# Patient Record
Sex: FEMALE | Race: OTHER | HISPANIC OR LATINO | Employment: UNEMPLOYED | URBAN - METROPOLITAN AREA
[De-identification: names, ages, dates, MRNs, and addresses within clinical notes are randomized per-mention and may not be internally consistent; named-entity substitution may affect disease eponyms.]

---

## 2017-07-14 ENCOUNTER — APPOINTMENT (EMERGENCY)
Dept: RADIOLOGY | Facility: HOSPITAL | Age: 52
End: 2017-07-14
Payer: COMMERCIAL

## 2017-07-14 ENCOUNTER — HOSPITAL ENCOUNTER (EMERGENCY)
Facility: HOSPITAL | Age: 52
Discharge: HOME | End: 2017-07-14
Attending: EMERGENCY MEDICINE | Admitting: EMERGENCY MEDICINE
Payer: COMMERCIAL

## 2017-07-14 VITALS
DIASTOLIC BLOOD PRESSURE: 59 MMHG | SYSTOLIC BLOOD PRESSURE: 111 MMHG | HEART RATE: 80 BPM | RESPIRATION RATE: 16 BRPM | TEMPERATURE: 97.8 F | OXYGEN SATURATION: 98 %

## 2017-07-14 DIAGNOSIS — M25.511 ACUTE PAIN OF RIGHT SHOULDER: Primary | ICD-10-CM

## 2017-07-14 LAB — DEPRECATED D DIMER PPP: <270 NG/ML (FEU) (ref 0–424)

## 2017-07-14 PROCEDURE — 85379 FIBRIN DEGRADATION QUANT: CPT | Performed by: EMERGENCY MEDICINE

## 2017-07-14 PROCEDURE — 36415 COLL VENOUS BLD VENIPUNCTURE: CPT | Performed by: EMERGENCY MEDICINE

## 2017-07-14 PROCEDURE — 73030 X-RAY EXAM OF SHOULDER: CPT

## 2017-07-14 PROCEDURE — 99284 EMERGENCY DEPT VISIT MOD MDM: CPT

## 2017-07-14 PROCEDURE — 96374 THER/PROPH/DIAG INJ IV PUSH: CPT

## 2017-07-14 RX ORDER — KETOROLAC TROMETHAMINE 30 MG/ML
15 INJECTION, SOLUTION INTRAMUSCULAR; INTRAVENOUS ONCE
Status: COMPLETED | OUTPATIENT
Start: 2017-07-14 | End: 2017-07-14

## 2017-07-14 RX ORDER — NAPROXEN 500 MG/1
500 TABLET ORAL 2 TIMES DAILY WITH MEALS
Qty: 20 TABLET | Refills: 0 | Status: SHIPPED | OUTPATIENT
Start: 2017-07-14 | End: 2018-08-03

## 2017-07-14 RX ADMIN — KETOROLAC TROMETHAMINE 15 MG: 30 INJECTION, SOLUTION INTRAMUSCULAR at 15:58

## 2018-01-18 NOTE — CONSULTS
Assessment    1  DCIS (ductal carcinoma in situ) of breast (233 0) (D05 10)    Plan  DCIS (ductal carcinoma in situ) of breast    · TraMADol HCl - 50 MG Oral Tablet; TAKE 1 TABLET 3 TIMES DAILY AS NEEDED   Rx By: Akin Baez; Dispense: 7 Days ; #:20 Tablet; Refill: 0; For: DCIS (ductal carcinoma in situ) of breast; DEDRICK = N; Print Rx   · Follow-up visit in 2 weeks Evaluation and Treatment  Follow-up  Status: Hold For -  Scheduling  Requested for: [id number]   Ordered; For: DCIS (ductal carcinoma in situ) of breast; Ordered By: Akni Baez Performed:  Due: [id number]   · Schedule Surgery Treatment  Procedure: Right breast lumpectomy needle localization   Status: Hold For - Scheduling  Requested for: [id number]   Ordered; For: DCIS (ductal carcinoma in situ) of breast; Ordered By: Akin Baez Performed:  Due: [id number]    Discussion/Summary  Discussion Summary:   Right breast ductal carcinoma in situ  This is amenable to needle localization with lumpectomy  Rationale for this was discussed with patient and her   Risks and benefits of surgery including infection, bleeding, and need for possible additional surgery were discussed with her  She understands and wishes to proceed as outlined  We'll therefore schedule for right breast needle localization and lumpectomy  Medication SE Review and Pt Understands Tx: The treatment plan was reviewed with the patient/guardian  The patient/guardian understands and agrees with the treatment plan   Understands and agrees with treatment plan: The treatment plan was reviewed with the patient/guardian  The patient/guardian understands and agrees with the treatment plan      Chief Complaint  Chief Complaint Free Text Note Form: Patient here for surgical consult right breast DCIS        History of Present Illness  HPI: Mrs Janett Bryant is a 49-year-old woman who underwent her annual mammogram  She is noted to have an area of calcifications in the right breast  This led to a biopsy confirming ductal carcinoma in situ  She herself does not feel any mass in this area  Work up with a mammogram, she would not even know anything was present in her breast       Review of Systems  Complete Female ROS SurgOnc:   Constitutional: The patient denies new or recent history of general fatigue, no recent weight loss, no change in appetite  Eyes: No complaints of visual problems, no scleral icterus  ENT: no complaints of ear pain, no hoarseness, no difficulty swallowing  Cardiovascular: No complaints of chest pain, no palpitations, no ankle edema  Respiratory: No complaints of shortness of breath, no cough  Gastrointestinal: No complaints of jaundice, no bloody stools, no pale stools  Genitourinary: No complaints of dysuria, no hematuria, no nocturia, no frequent urination, no urethral discharge  Musculoskeletal: No complaints of weakness, paralysis, joint stiffness or arthralgias,  Integumentary: breast lump  Neurological: No complaints of convulsions, no seizures, no dizziness  Hematologic/Lymphatic: No complaints of easy bruising  ROS Reviewed:   ROS reviewed  Past Medical History  Active Problems And Past Medical History Reviewed: The active problems and past medical history were reviewed and updated today  Surgical History  Surgical History Reviewed: The surgical history was reviewed and updated today  Family History  Family History Reviewed: The family history was reviewed and updated today  Social History  Social History Reviewed: The social history was reviewed and updated today  Current Meds  Medication List Reviewed: The medication list was reviewed and updated today        Vitals  Vital Signs [Data Includes: Current Encounter]    Recorded: [id number] 12:01PM   Temperature 98 7 F   Heart Rate 80   Respiration 16   Systolic 449   Diastolic 68   Height 5 ft 2 in   Weight 161 lb 6 4 oz   BMI Calculated 29 52   BSA Calculated 1 74 Physical Exam    Constitutional: General appearance: The Patient is well-developed, well-nourished female who appears her stated age in no acute distress  She is pleasant and talkative  HEENT: Head and face: Normal   Conjunctiva and lids: No swelling, erythema, or discharge  MARIA TERESA, EOMI and the sclerae are anicteric  There is no oral pathology noted  There is no nasal pathology noted  The thyroid is normal to inspection and palpation  There is no appreciable cervical adenopathy   There are no carotid bruits  The Cranial Nerves II - XII are grossly intact  Neck is supple without adenopathy  Chest: The chest is normal to inspection  The lungs are clear to auscultation  There are bilaterally symmetrical breath sounds  There is a RRR, normal S1 and S2, without murmurs, rub or gallop  The pulses are normal at the radial and dorsalis pedis and symmetrical     Abdomen: The abdomen is normal to inspection and percussion  It is soft, non-tender  There are normal bowel sounds  There are no abnormal masses  There is no evidence of hepatosplenomegaly  She does not have an umbilical hernia  Extremities: There is no clubbing or cyanosis  There is no edema  Sensation is intact to light touch  Neuro: Grossly nonfocal   Motor strength: Normal motor strength     Orientation to person, place and time: Normal   Mood and affect: Normal     Lymphatic: no evidence of cervical adenopathy bilaterally  no evidence of axillary adenopathy bilaterally  Skin: Warm, anicteric  Examination of Breast Normal        Results/Data  Diagnostic Studies Reviewed Surg Onc:   X-ray Review * MAMMO SCREENING BILATERAL W CAD Final    No Documents Attached    Test ordered by:         --------------------------------------------------------------------------------   Patient History:   Family history of breast cancer in sister at age 47  Patient has never smoked  Patient's BMI is 30 2       Reason for exam: screening (asymptomatic)  Screening     Mammo Screening Bilateral W CAD: January 11, 2016 - Check In #:   [de-identified]   Bilateral CC and MLO view(s) were taken  Technologist: JESUS Dean (JESUS)(M)   Prior study comparison: December 10, 2012, bilateral digital   screening mammogram, performed at [place] [address]  Eli 15, 2009, bilateral digital screening   mammogram, performed at [place] [address]  June 6, 2008, left breast unilateral diagnostic   mammogram, performed at [address] [place] [address]  May   30, 2008, bilateral [facility] DIGITAL SCRN MAMMO, performed at [place] [address]  There are scattered fibroglandular densities  There is a group   of calcifications in the upper outer right breast at the 10   o'clock position, 7 cm from the nipple  Calcifications were seen   in this area of the breast on prior studies  The patient's most   recent mammogram was dated 2012  The number of calcifications   has increased since 2012  A few, but not all, of the   calcifications are seen to layer on the mediolateral oblique   view, and thus further evaluation with lateral and spot   magnification views is recommended to exclude pathology  The parenchymal pattern is otherwise stable  No dominant soft   tissue mass or architectural distortion are noted in either   breast  The skin and nipple contours are within normal limits  Impressions:   1  Upper outer right breast calcifications  Lateral and spot   magnification views are recommended  2  Stable left breast mammogram      ASSESSMENT: BiRad:0 - Incomplete: needs additional imaging   evaluation - Right     Recommendation:   Further imaging of the right breast    A breast health care nurse from our facility will be contacting   the patient regarding the need for additional imaging  Analyzed by CAD     8-10% of cancers will be missed on mammography   Management of a   palpable abnormality must be based on clinical grounds  Patients   will be notified of their results via letter from our facility  Accredited by [employer] and FDA  Transcription Location: [address] [place] [address] [place]: [id number]     Risk Value(s):   Tyrer-Cuzick 10 Year: 4 227%, Tyrer-Cuzick Lifetime: 17 535%,   Myriad Table: 1 5%, MARIANN 5 Year: 1 3%, NCI Lifetime: 11 8%         Ordered by: EPIC, Provider Collected/Examined: [id number] 03:29PM   Verification Not Required Stage: Final Resulted: [id number] 03:29PM Last Updated: [id number] 06:59PM Accession: [id number]  Pathology Review MAMMO DIAGNOSTIC RIGHT W CAD Final    No Documents Attached    Test ordered by: Sal Vaughan         --------------------------------------------------------------------------------   Patient History:   Family history of breast cancer in sister at age 47  Patient has never smoked  Patient's BMI is 30 2  Reason for exam: additional evaluation requested from abnormal   screening  Callback for calcification/density in the upper outer right   breast      Mammo Diagnostic Right W CAD: January 13, 2016 - Check In #:   [de-identified]   CC with magnification, ML with magnification, and ML view(s) were   taken of the right breast      Technologist: JESUS Garcia (R)(M)   Prior study comparison: January 11, 2016, mammo screening   bilateral W CAD, performed at [address] [place] [address],[address]  December 10, 2012, bilateral digital screening   mammogram, performed at [address] [place] [address]  Eli 15, 2009, bilateral digital screening mammogram,   performed at [address] [place] [address]  May 30,   2008, bilateral [facility] DIGITAL SCRN MAMMO, performed at [address] [place] [address]  November 20, 2006, bilateral   screening mammogram w/CAD, performed at [address] [place] [address]  There are scattered fibroglandular densities   These images were   obtained using digital technique and with the assistance of   Computer Aided Detection  There are no dominant masses, foci of   architectural distortion to suggest malignancy  The visualized   skin appears normal  The numerous calcifications in the upper   outer right breast layer, there are no new/indeterminate   calcifications for which stereotactic core biopsy is recommended  Targeted ultrasound demonstrates no evidence of hypoechoic mass   or architectural distortion to suggest malignancy  Calcifications may be identified, however, stereotactic core   biopsy would be higher yield in targeting the more indeterminate   calcifications  US Breast Right Limited: January 13, 2016 - Check In #: [de-identified]   Technologist: Josué Presjania, TULIO     ASSESSMENT: BiRad:4 - Suspicious (Overall)     Recommendation:   Stereotactic biopsy of the right breast    Analyzed by CAD     Transcription Location: [address]   Signing Station: [id number]     Risk Value(s):   Tyrer-Cuzick 10 Year: 4 227%, Tyrer-Cuzick Lifetime: 17 535%,   Myriad Table: 1 5%, MARIANN 5 Year: 1 9%, NCI Lifetime: 16 4%         Ordered by: EPIC, Provider Collected/Examined: [id number] 01:19PM   Verification Not Required Stage: Final Resulted: [id number] 01:19PM Last Updated: [id number] 01:19PM Accession: [id number]      US BREAST RIGHT LIMITED Final    No Documents Attached    Test ordered by: Browning Spina         --------------------------------------------------------------------------------   Patient History:   Family history of breast cancer in sister at age 47  Patient has never smoked  Patient's BMI is 30 2  Reason for exam: additional evaluation requested from abnormal   screening     Callback for calcification/density in the upper outer right   breast      Mammo Diagnostic Right W CAD: January 13, 2016 - Check In #:   [de-identified]   CC with magnification, ML with magnification, and ML view(s) were   taken of the right breast      Technologist: Steven Clark, R  T (R)(M)   Prior study comparison: January 11, 2016, mammo screening   bilateral W CAD, performed at [facility]  December 10, 2012, bilateral digital screening   mammogram, performed at [place] [address]  Eli 15, 2009, bilateral digital screening mammogram,   performed at [place] [address]  May 30,   2008, bilateral [facility] DIGITAL SCRN MAMMO, performed at [address] [place] [address]  November 20, 2006, bilateral   screening mammogram w/CAD, performed at [place] [address]  There are scattered fibroglandular densities  These images were   obtained using digital technique and with the assistance of   Computer Aided Detection  There are no dominant masses, foci of   architectural distortion to suggest malignancy  The visualized   skin appears normal  The numerous calcifications in the upper   outer right breast layer, there are no new/indeterminate   calcifications for which stereotactic core biopsy is recommended  Targeted ultrasound demonstrates no evidence of hypoechoic mass   or architectural distortion to suggest malignancy  Calcifications may be identified, however, stereotactic core   biopsy would be higher yield in targeting the more indeterminate   calcifications       US Breast Right Limited: January 13, 2016 - Check In #: [de-identified]   Technologist: Zhao Camacho RDMS     ASSESSMENT: BiRad:4 - Suspicious (Overall)     Recommendation:   Stereotactic biopsy of the right breast    Analyzed by CAD     Transcription Location: [address]   Signing Station: [id number]     Risk Value(s):   Tyrer-Cuzick 10 Year: 4 227%, Tyrer-Cuzick Lifetime: 17 535%,   Myriad Table: 1 5%, MARIANN 5 Year: 1 9%, NCI Lifetime: 16 4%         Ordered byLawyer Kayser, Provider Collected/Examined: [id number] 01:19PM   Verification Not Required Stage: Final Resulted: [id number] 01:19PM Last Updated: [id number] 01:19PM Accession: [id number]      * MAMMO SCREENING BILATERAL W CAD Final    No Documents Attached    Test ordered by:         --------------------------------------------------------------------------------   Patient History:   Family history of breast cancer in sister at age 47  Patient has never smoked  Patient's BMI is 30 2  Reason for exam: screening (asymptomatic)  Screening     Mammo Screening Bilateral W CAD: January 11, 2016 - Check In #:   [de-identified]   Bilateral CC and MLO view(s) were taken  Technologist: JESUS Crane (JESUS)(M)   Prior study comparison: December 10, 2012, bilateral digital   screening mammogram, performed at [place] [address]  Eli 15, 2009, bilateral digital screening   mammogram, performed at [place] [address]  June 6, 2008, left breast unilateral diagnostic   mammogram, performed at [address] [place] [address]  May   30, 2008, bilateral [facility] DIGITAL SCRN MAMMO, performed at [place] [address]  There are scattered fibroglandular densities  There is a group   of calcifications in the upper outer right breast at the 10   o'clock position, 7 cm from the nipple  Calcifications were seen   in this area of the breast on prior studies  The patient's most   recent mammogram was dated 2012  The number of calcifications   has increased since 2012  A few, but not all, of the   calcifications are seen to layer on the mediolateral oblique   view, and thus further evaluation with lateral and spot   magnification views is recommended to exclude pathology  The parenchymal pattern is otherwise stable  No dominant soft   tissue mass or architectural distortion are noted in either   breast  The skin and nipple contours are within normal limits  Impressions:   1  Upper outer right breast calcifications  Lateral and spot   magnification views are recommended     2  Stable left breast mammogram      ASSESSMENT: BiRad:0 - Incomplete: needs additional imaging   evaluation - Right     Recommendation:   Further imaging of the right breast    A breast health care nurse from our facility will be contacting   the patient regarding the need for additional imaging  Analyzed by CAD     8-10% of cancers will be missed on mammography  Management of a   palpable abnormality must be based on clinical grounds  Patients   will be notified of their results via letter from our facility  Accredited by [employer] and FDA  Transcription Location: [facility]: [id number]     Risk Value(s):   Tyrer-Cuzick 10 Year: 4 227%, Tyrer-Cuzick Lifetime: 17 535%,   Myriad Table: 1 5%, MARIANN 5 Year: 1 3%, NCI Lifetime: 11 8%         Ordered by: EPIC, Provider Collected/Examined: [id number] 03:29PM   Verification Not Required Stage: Final Resulted: [id number] 03:29PM Last Updated: [id number] 06:59PM Accession: [id number]        Signatures   Electronically signed by : Adeel Loyd MD; Jan 29 2016 12:48PM EST                       (Author)

## 2018-08-03 ENCOUNTER — APPOINTMENT (EMERGENCY)
Dept: CT IMAGING | Facility: HOSPITAL | Age: 53
End: 2018-08-03
Payer: COMMERCIAL

## 2018-08-03 ENCOUNTER — HOSPITAL ENCOUNTER (EMERGENCY)
Facility: HOSPITAL | Age: 53
Discharge: HOME | End: 2018-08-03
Attending: EMERGENCY MEDICINE | Admitting: EMERGENCY MEDICINE
Payer: COMMERCIAL

## 2018-08-03 VITALS
OXYGEN SATURATION: 96 % | SYSTOLIC BLOOD PRESSURE: 106 MMHG | DIASTOLIC BLOOD PRESSURE: 53 MMHG | HEART RATE: 96 BPM | RESPIRATION RATE: 20 BRPM | TEMPERATURE: 97.8 F

## 2018-08-03 DIAGNOSIS — K52.9 COLITIS, ACUTE: Primary | ICD-10-CM

## 2018-08-03 LAB
ALBUMIN SERPL BCP-MCNC: 3.8 G/DL (ref 3.5–5)
ALP SERPL-CCNC: 86 U/L (ref 46–116)
ALT SERPL W P-5'-P-CCNC: 27 U/L (ref 12–78)
ANION GAP SERPL CALCULATED.3IONS-SCNC: 7 MMOL/L (ref 4–13)
AST SERPL W P-5'-P-CCNC: 20 U/L (ref 5–45)
BACTERIA UR QL AUTO: ABNORMAL /HPF
BASOPHILS # BLD MANUAL: 0 THOUSAND/UL (ref 0–0.1)
BASOPHILS NFR MAR MANUAL: 0 % (ref 0–1)
BILIRUB SERPL-MCNC: 0.25 MG/DL (ref 0.2–1)
BILIRUB UR QL STRIP: NEGATIVE
BUN SERPL-MCNC: 11 MG/DL (ref 5–25)
CALCIUM SERPL-MCNC: 9.3 MG/DL (ref 8.3–10.1)
CHLORIDE SERPL-SCNC: 102 MMOL/L (ref 100–108)
CLARITY UR: CLEAR
CO2 SERPL-SCNC: 28 MMOL/L (ref 21–32)
COLOR UR: YELLOW
CREAT SERPL-MCNC: 0.92 MG/DL (ref 0.6–1.3)
EOSINOPHIL # BLD MANUAL: 0 THOUSAND/UL (ref 0–0.4)
EOSINOPHIL NFR BLD MANUAL: 0 % (ref 0–6)
ERYTHROCYTE [DISTWIDTH] IN BLOOD BY AUTOMATED COUNT: 14.1 % (ref 11.6–15.1)
GFR SERPL CREATININE-BSD FRML MDRD: 71 ML/MIN/1.73SQ M
GLUCOSE SERPL-MCNC: 87 MG/DL (ref 65–140)
GLUCOSE UR STRIP-MCNC: NEGATIVE MG/DL
HCT VFR BLD AUTO: 43.6 % (ref 34.8–46.1)
HGB BLD-MCNC: 14.1 G/DL (ref 11.5–15.4)
HGB UR QL STRIP.AUTO: ABNORMAL
KETONES UR STRIP-MCNC: ABNORMAL MG/DL
LEUKOCYTE ESTERASE UR QL STRIP: NEGATIVE
LIPASE SERPL-CCNC: 113 U/L (ref 73–393)
LYMPHOCYTES # BLD AUTO: 1.26 THOUSAND/UL (ref 0.6–4.47)
LYMPHOCYTES # BLD AUTO: 24 % (ref 14–44)
MCH RBC QN AUTO: 27.7 PG (ref 26.8–34.3)
MCHC RBC AUTO-ENTMCNC: 32.3 G/DL (ref 31.4–37.4)
MCV RBC AUTO: 86 FL (ref 82–98)
MONOCYTES # BLD AUTO: 0.32 THOUSAND/UL (ref 0–1.22)
MONOCYTES NFR BLD: 6 % (ref 4–12)
NEUTROPHILS # BLD MANUAL: 3.68 THOUSAND/UL (ref 1.85–7.62)
NEUTS BAND NFR BLD MANUAL: 12 % (ref 0–8)
NEUTS SEG NFR BLD AUTO: 58 % (ref 43–75)
NITRITE UR QL STRIP: NEGATIVE
NON-SQ EPI CELLS URNS QL MICRO: ABNORMAL /HPF
NRBC BLD AUTO-RTO: 0 /100 WBCS
PH UR STRIP.AUTO: 7 [PH] (ref 4.5–8)
PLATELET # BLD AUTO: 209 THOUSANDS/UL (ref 149–390)
PLATELET BLD QL SMEAR: ADEQUATE
PMV BLD AUTO: 9.9 FL (ref 8.9–12.7)
POTASSIUM SERPL-SCNC: 3.2 MMOL/L (ref 3.5–5.3)
PROT SERPL-MCNC: 7.9 G/DL (ref 6.4–8.2)
PROT UR STRIP-MCNC: ABNORMAL MG/DL
RBC # BLD AUTO: 5.09 MILLION/UL (ref 3.81–5.12)
RBC #/AREA URNS AUTO: ABNORMAL /HPF
RBC MORPH BLD: NORMAL
SODIUM SERPL-SCNC: 137 MMOL/L (ref 136–145)
SP GR UR STRIP.AUTO: 1.01 (ref 1–1.03)
TOTAL CELLS COUNTED SPEC: 100
UROBILINOGEN UR QL STRIP.AUTO: 0.2 E.U./DL
WBC # BLD AUTO: 5.25 THOUSAND/UL (ref 4.31–10.16)
WBC #/AREA URNS AUTO: ABNORMAL /HPF

## 2018-08-03 PROCEDURE — 85027 COMPLETE CBC AUTOMATED: CPT | Performed by: EMERGENCY MEDICINE

## 2018-08-03 PROCEDURE — 80053 COMPREHEN METABOLIC PANEL: CPT | Performed by: EMERGENCY MEDICINE

## 2018-08-03 PROCEDURE — 74177 CT ABD & PELVIS W/CONTRAST: CPT

## 2018-08-03 PROCEDURE — 36415 COLL VENOUS BLD VENIPUNCTURE: CPT

## 2018-08-03 PROCEDURE — 81001 URINALYSIS AUTO W/SCOPE: CPT

## 2018-08-03 PROCEDURE — 85007 BL SMEAR W/DIFF WBC COUNT: CPT | Performed by: EMERGENCY MEDICINE

## 2018-08-03 PROCEDURE — 99285 EMERGENCY DEPT VISIT HI MDM: CPT

## 2018-08-03 PROCEDURE — 83690 ASSAY OF LIPASE: CPT | Performed by: EMERGENCY MEDICINE

## 2018-08-03 RX ORDER — CIPROFLOXACIN 500 MG/1
500 TABLET, FILM COATED ORAL EVERY 12 HOURS
Qty: 20 TABLET | Refills: 0 | Status: SHIPPED | OUTPATIENT
Start: 2018-08-03 | End: 2018-08-13

## 2018-08-03 RX ADMIN — IOHEXOL 100 ML: 350 INJECTION, SOLUTION INTRAVENOUS at 11:36

## 2018-08-03 NOTE — ED NOTES
Patient made aware if she needs to have a bowel movement to let us know as we need to collect specimen       Callum Powell RN  08/03/18 5723

## 2018-08-03 NOTE — ED NOTES
Patient still unable to provide stool sample; provider notified      Cristine Chávez RN  08/03/18 [phone]

## 2018-08-03 NOTE — ED NOTES
Patient ambulatory to the bathroom with steady gait at this time to provide a stool sample      Robbi Seo RN  08/03/18 9982

## 2018-08-03 NOTE — ED PROVIDER NOTES
History  Chief Complaint   Patient presents with    Black or Bloody Stool     pt reports blood in stool x2 days  19-year-old female presents for evaluation of several days mild abdominal discomfort with associated blood in loose stools  The patient states that her symptoms began after she returned from a medical mission trip to [facility]  The patient denies previous history of GI bleeding, or abdominal pathology  History provided by:  Patient  Black or Bloody Stool   Quality:  Bright red  Amount: Moderate  Timing:  Constant  Chronicity:  New  Context: defecation and spontaneously    Context: not anal fissures, not anal penetration, not diarrhea and not rectal pain    Similar prior episodes: no    Relieved by:  Nothing  Worsened by:  Defecation  Ineffective treatments:  None tried  Associated symptoms: abdominal pain    Associated symptoms: no dizziness, no fever, no recent illness and no vomiting        None       Past Medical History:   Diagnosis Date    Breast mass     right mass-needle loc w/lumpectomy today    Wears glasses        Past Surgical History:   Procedure Laterality Date     SECTION      x2    DIAGNOSTIC LAPAROSCOPY      MD MASTECTOMY, PARTIAL Right 2016    Procedure: LUMPECTOMY RIGHT BREAST WITH FAXITRON;  Surgeon: Sabra Whitney MD;  Location: [facility] OR;  Service: Surgical Oncology    TUBAL LIGATION         History reviewed  No pertinent family history  I have reviewed and agree with the history as documented  Social History   Substance Use Topics    Smoking status: Never Smoker    Smokeless tobacco: Never Used    Alcohol use No        Review of Systems   Constitutional: Negative for chills, fatigue and fever  HENT: Negative for sore throat  Eyes: Negative for visual disturbance  Respiratory: Negative for shortness of breath  Cardiovascular: Negative for chest pain  Gastrointestinal: Positive for abdominal pain and blood in stool   Negative for diarrhea, nausea, rectal pain and vomiting  Genitourinary: Negative for difficulty urinating, dysuria and pelvic pain  Musculoskeletal: Negative for back pain  Skin: Negative for rash  Neurological: Negative for dizziness, syncope, weakness and headaches  All other systems reviewed and are negative  Physical Exam  Physical Exam   Constitutional: She is oriented to person, place, and time  She appears well-developed and well-nourished  No distress  HENT:   Head: Normocephalic and atraumatic  Right Ear: External ear normal    Left Ear: External ear normal    Eyes: Conjunctivae and EOM are normal  Pupils are equal, round, and reactive to light  No scleral icterus  Neck: Normal range of motion  Cardiovascular: Normal rate, regular rhythm and normal heart sounds  Pulmonary/Chest: Effort normal and breath sounds normal  No respiratory distress  Abdominal: Soft  Bowel sounds are normal  There is tenderness in the left lower quadrant  There is no rebound and no guarding  Genitourinary: Rectal exam shows guaiac positive stool ( brown/bloody)  Musculoskeletal: Normal range of motion  She exhibits no edema  Neurological: She is alert and oriented to person, place, and time  Skin: Skin is warm and dry  No rash noted  Psychiatric: She has a normal mood and affect  Nursing note and vitals reviewed        Vital Signs  ED Triage Vitals   Temperature Pulse Respirations Blood Pressure SpO2   08/03/18 1001 08/03/18 1001 08/03/18 1001 08/03/18 1001 08/03/18 1001   97 8 °F (36 6 °C) (!) 106 20 105/55 100 %      Temp Source Heart Rate Source Patient Position - Orthostatic VS BP Location FiO2 (%)   08/03/18 1001 08/03/18 1246 08/03/18 1001 08/03/18 1001 --   Temporal Monitor Sitting Right arm       Pain Score       08/03/18 1001       5           Vitals:    08/03/18 1001 08/03/18 1021 08/03/18 1246   BP: 105/55 107/51 106/53   Pulse: (!) 106 95 96   Patient Position - Orthostatic VS: Sitting  Lying Visual Acuity      ED Medications  Medications   iohexol (OMNIPAQUE) 350 MG/ML injection (MULTI-DOSE) 100 mL (100 mL Intravenous Given 8/3/18 1136)       Diagnostic Studies  Results Reviewed     Procedure Component Value Units Date/Time    CBC and differential [[id number]] Collected:  08/03/18 1018    Lab Status:  Final result Specimen:  Blood from Arm, Left Updated:  08/03/18 1108     WBC 5 25 Thousand/uL      RBC 5 09 Million/uL      Hemoglobin 14 1 g/dL      Hematocrit 43 6 %      MCV 86 fL      MCH 27 7 pg      MCHC 32 3 g/dL      RDW 14 1 %      MPV 9 9 fL      Platelets 505 Thousands/uL      nRBC 0 /100 WBCs     Narrative: This is an appended report  These results have been appended to a previously verified report  Comprehensive metabolic panel [[id number]]  (Abnormal) Collected:  08/03/18 1018    Lab Status:  Final result Specimen:  Blood from Arm, Right Updated:  08/03/18 1045     Sodium 137 mmol/L      Potassium 3 2 (L) mmol/L      Chloride 102 mmol/L      CO2 28 mmol/L      Anion Gap 7 mmol/L      BUN 11 mg/dL      Creatinine 0 92 mg/dL      Glucose 87 mg/dL      Calcium 9 3 mg/dL      AST 20 U/L      ALT 27 U/L      Alkaline Phosphatase 86 U/L      Total Protein 7 9 g/dL      Albumin 3 8 g/dL      Total Bilirubin 0 25 mg/dL      eGFR 71 ml/min/1 73sq m     Narrative:         National Kidney Disease Education Program recommendations are as follows:  GFR calculation is accurate only with a steady state creatinine  Chronic Kidney disease less than 60 ml/min/1 73 sq  meters  Kidney failure less than 15 ml/min/1 73 sq  meters      Lipase [[id number]]  (Normal) Collected:  08/03/18 1018    Lab Status:  Final result Specimen:  Blood from Arm, Right Updated:  08/03/18 1045     Lipase 113 u/L     Urine Microscopic [[id number]]  (Abnormal) Collected:  08/03/18 1022    Lab Status:  Final result Specimen:  Urine from Urine, Clean Catch Updated:  08/03/18 1035     RBC, UA 2-4 (A) /hpf      WBC, UA None Seen /hpf      Epithelial Cells Occasional /hpf      Bacteria, UA Occasional /hpf     POCT urinalysis dipstick [[id number]]  (Abnormal) Resulted:  08/03/18 1018    Lab Status:  Final result Specimen:  Urine from Urine, Other Updated:  08/03/18 1018    ED Urine Macroscopic [[id number]]  (Abnormal) Collected:  08/03/18 1022    Lab Status:  Final result Specimen:  Urine Updated:  08/03/18 1017     Color, UA Yellow     Clarity, UA Clear     pH, UA 7 0     Leukocytes, UA Negative     Nitrite, UA Negative     Protein, UA Trace (A) mg/dl      Glucose, UA Negative mg/dl      Ketones, UA Trace (A) mg/dl      Urobilinogen, UA 0 2 E U /dl      Bilirubin, UA Negative     Blood, UA Trace (A)     Specific [place], UA 1 015    Narrative:       CLINITEK RESULT                 CT abdomen pelvis with contrast   Final Result by Gina Gosselin, DO (08/03 1158)   Patient's symptoms are likely secondary to colitis affecting the descending colon from the level of the splenic flexure to the juncture with sigmoid colon  No mary perforation, abscess, or obstruction  Workstation performed: [id number]                    Procedures  Procedures       Phone Contacts  ED Phone Contact    ED Course  ED Course as of Aug 04 1029   Fri Aug 03, 2018   1231 Patient is stable upon re-evaluation  I discussed the roots of the laboratory as well as the CT scan with the patient  The plan is for the patient be started on Cipro and follow up with her primary care provider as an outpatient                                  MDM  Number of Diagnoses or Management Options  Colitis, acute: new and requires workup  Diagnosis management comments: Differential diagnosis:  Gastritis, enteritis, diverticulitis, pancreatitis, urinary tract infection, other    All labs reviewed and utilized in the medical decision making process    All radiology studies independently viewed by me and interpreted by the radiologist       The patient (and any family present) verbalized understanding of the discharge instructions and warnings that would necessitate return to the Emergency Department  All questions were answered prior to discharge  Amount and/or Complexity of Data Reviewed  Clinical lab tests: ordered and reviewed  Tests in the radiology section of CPT®: ordered and reviewed  Review and summarize past medical records: yes  Independent visualization of images, tracings, or specimens: yes      CritCare Time    Disposition  Final diagnoses:   Colitis, acute     Time reflects when diagnosis was documented in both MDM as applicable and the Disposition within this note     Time User Action Codes Description Comment    8/3/2018 12:32 PM Jennifer Springer Add [K52 9] Colitis, acute       ED Disposition     ED Disposition Condition Comment    Discharge  Maria Gardner discharge to home/self care  Condition at discharge: Stable        Follow-up Information     Follow up With Specialties Details Why Contact Info Additional Information    Ashwin Veras MD Family Medicine In 1 week For further evaluation, if not improved [address]  [address] Emergency Department Emergency Medicine Go to For further evaluation, If symptoms worsen [address] [place] [address]  [phone] [address] [place] [address] ED, [address] [place] [address], [facility]          Discharge Medication List as of 8/3/2018 12:34 PM      START taking these medications    Details   ciprofloxacin (CIPRO) 500 mg tablet Take 1 tablet (500 mg total) by mouth every 12 (twelve) hours for 10 days, Starting Fri 8/3/2018, Until Mon 8/13/2018, Normal           No discharge procedures on file      ED Provider  Electronically Signed by           Santiago Thibodeaux DO  08/04/18 6686

## 2018-08-03 NOTE — DISCHARGE INSTRUCTIONS
Colitis   LO QUE NECESITA SABER:   La colitis es la inflamación e irritación del colon  La colitis se puede producir por Nando Earnest o un problema con pappas sistema inmune  También lo puede causar iain bacteria, un virus o parásitos  Es probable que la causa no sea conocida  Usted puede tener diarrea, dolor abdominal, fiebre o harleen o mucosidad en donna deposiciones  INSTRUCCIONES SOBRE [facility]ITAL[facility]:   Regrese a la sue de emergencias si:   · Usted tiene dificultad repentina para respirar  · Donna evacuaciones intestinales son de color ajay o contienen harleen  · Pappas vómito tiene Loida  · Tiene dolor abdominal grave, o tiene el abdomen elen e hinchado  · Usted tiene alguno de los siguientes síntomas de deshidratación:     ¨ Mareos o debilidad    ¨ Sequedad en la boca, labios partidos o sed intensa    ¨ Latidos cardíacos o respiración acelerada    ¨ Orinar poco o nada en lo absoluto  Pregúntele a pappas médico qué vitaminas y minerales son adecuados para usted  · Donna síntomas empeoran o no desaparecen  · Usted tiene fiebre, escalofríos, tos o se siente débil y adolorido  · Pierde peso sin proponérselo  · Usted tiene preguntas o inquietudes acerca de pappas condición o cuidado  Medicamentos:   · Medicamentos,  para disminuir la inflamación en pappas colon y tratar la diarrea  · Ward donna medicamentos jeannie se le haya indicado  Consulte con pappas médico si usted eugenia que pappas medicamento no le está ayudando o si presenta efectos secundarios  Infórmele si es alérgico a algún medicamento  Mantenga iain lista actualizada de los [place], las vitaminas y los productos herbales que kenny  Incluya los siguientes datos de los medicamentos: cantidad, frecuencia y motivo de administración  Traiga con usted la lista o los envases de la píldoras a donna citas de seguimiento  Lleve la lista de los medicamentos con usted en silva de iain emergencia    El [place] de pappas síntomas:   · Consuma líquidos según le indicaron  para evitar la deshidratación  Los mejores líquidos para luis incluyen el agua, [place] y caldo  Pregunte cuánto líquido luis cada día  Es posible que necesite luis iain solución de rehidratación oral (SRO)  La solución contiene la proporción [place] de agua, fredi y azúcar para reemplazar los líquidos corporales que se pierden rashaun la diarrea  · Consuma alimentos saludables y variados  Los alimentos saludables incluyen frutas, verduras, pan integral, productos lácteos con bajo contenido de grasa, angela [place] y pescado  Es posible que deba ingerir varias porciones pequeñas rashaun el día, en lugar de porciones grandes  Evite las comidas picantes, la cafeína, chocolate y alimentos altos en grasa  · Consulte con abreu médico antes de luis MARCOS  Los [place] pueden [employer] síntomas si las [employer] causan colitis  · Empiece a hacer ejercicio cuando se sienta mejor  El ejercicio regular ayuda a normalizar las evacuaciones  Pida más información acerca de un plan de ejercicio adecuado para usted  Acuda a day consultas de control con abreu médico según le indicaron  Es posible que deba regresar para [employer] colonoscopia u otras pruebas  Avis [place] cuántas veces tuvo iain evacuación intestinal y qué aspecto tenían  [place] esto a day citas de seguimiento  Anote day preguntas para que se acuerde de hacerlas rashaun day visitas  © 2017 [address] [employer] Information is for End User's use only and may not be sold, redistributed or otherwise used for commercial purposes  All illustrations and images included in CareNotes® are the copyrighted property of A D A M , Inc  or Lei Interiano  Esta información es sólo para uso en educación  Abreu intención no es darle un consejo médico sobre enfermedades o tratamientos  Colsulte con abreu Melven Rimes farmacéutico antes de seguir cualquier régimen médico para saber si es seguro y efectivo para usted

## 2019-09-13 RX ORDER — TAMOXIFEN CITRATE 20 MG/1
20 TABLET ORAL
COMMUNITY
End: 2022-03-03

## 2019-09-17 ENCOUNTER — OFFICE VISIT (OUTPATIENT)
Dept: FAMILY MEDICINE CLINIC | Facility: CLINIC | Age: 54
End: 2019-09-17
Payer: COMMERCIAL

## 2019-09-17 VITALS
WEIGHT: 161.4 LBS | DIASTOLIC BLOOD PRESSURE: 80 MMHG | SYSTOLIC BLOOD PRESSURE: 120 MMHG | BODY MASS INDEX: 29.7 KG/M2 | OXYGEN SATURATION: 97 % | TEMPERATURE: 98.2 F | HEIGHT: 62 IN | HEART RATE: 93 BPM

## 2019-09-17 DIAGNOSIS — Z90.11 S/P PARTIAL MASTECTOMY, RIGHT: ICD-10-CM

## 2019-09-17 DIAGNOSIS — Z13.220 SCREENING FOR CHOLESTEROL LEVEL: ICD-10-CM

## 2019-09-17 DIAGNOSIS — R73.9 HYPERGLYCEMIA: ICD-10-CM

## 2019-09-17 DIAGNOSIS — Z00.01 ENCOUNTER FOR WELL ADULT EXAM WITH ABNORMAL FINDINGS: ICD-10-CM

## 2019-09-17 DIAGNOSIS — R00.2 PALPITATIONS: Primary | ICD-10-CM

## 2019-09-17 PROBLEM — N30.10 CHRONIC INTERSTITIAL CYSTITIS WITHOUT HEMATURIA: Status: ACTIVE | Noted: 2019-09-17

## 2019-09-17 PROCEDURE — 93000 ELECTROCARDIOGRAM COMPLETE: CPT | Performed by: NURSE PRACTITIONER

## 2019-09-17 PROCEDURE — 99203 OFFICE O/P NEW LOW 30 MIN: CPT | Performed by: NURSE PRACTITIONER

## 2019-09-17 PROCEDURE — 3008F BODY MASS INDEX DOCD: CPT | Performed by: NURSE PRACTITIONER

## 2019-09-17 RX ORDER — LETROZOLE 2.5 MG/1
TABLET, FILM COATED ORAL
COMMUNITY
Start: 2019-08-20 | End: 2022-03-03

## 2019-09-17 NOTE — ASSESSMENT & PLAN NOTE
-Pt with history of partial mastectomy in 2016  Had 35 treatments completed in [place] for radiation  Is current on her mammogram  Under care of heme/onc in [place]  Currently sees him every 6 months  Will request records

## 2019-09-17 NOTE — PROGRESS NOTES
Assessment/Plan:    Palpitations  -EKG shows NSR  No cardiogenic risk factors  Ordering 48 hour holter monitor  Ordering TSH and CBC  S/P partial mastectomy, right  -Pt with history of partial mastectomy in 2016  Had 35 treatments completed in [place] for radiation  Is current on her mammogram  Under care of heme/onc in [place]  Currently sees him every 6 months  Will request records  Screening for cholesterol level  -Checking lipid panel    Hyperglycemia  -Checking CMP   BMI Counseling: Body mass index is 30 kg/m²  The BMI is above normal  Nutrition recommendations include reducing portion sizes, reducing fast food intake, moderation in carbohydrate intake, reducing intake of saturated fat and trans fat and reducing intake of cholesterol  Exercise recommendations include moderate aerobic physical activity for 150 minutes/week  Diagnoses and all orders for this visit:    Palpitations  -     POCT ECG  -     TSH, 3rd generation with Free T4 reflex; Future  -     Holter monitor - 48 hour; Future    S/P partial mastectomy, right    Hyperglycemia  -     Comprehensive metabolic panel; Future    Screening for cholesterol level  -     Lipid panel; Future    Encounter for well adult exam with abnormal findings  -     CBC and differential; Future        BMI Counseling: Body mass index is 30 kg/m²  The BMI is above normal  Nutrition recommendations include reducing portion sizes, 3-5 servings of fruits/vegetables daily, reducing fast food intake, moderation in carbohydrate intake, reducing intake of saturated fat and trans fat and reducing intake of cholesterol  Exercise recommendations include moderate aerobic physical activity for 150 minutes/week  Subjective:      Patient ID: Caryle Patience is a 47 y o  female  47year old female patient here to re-establish care  PMHx: Breast cancer- 2016, partial right mastectomy, had 35 radiation therapy  Cancer free since 2016   Is currently seeing heme/onc from [place]  Will sign a medical release of records  Had a dexa scan this year 2019 and has osteopenia  Currently taking vitamin D with calcium  Colonoscopy in 2016 not due  Mammogram in 2019 with normal and had done in [place]  Palpitations   This is a new problem  The current episode started 1 to 4 weeks ago (3 weeks ago)  The problem occurs intermittently (3x a week)  The problem has been waxing and waning  Associated symptoms include weakness  Pertinent negatives include no anorexia, arthralgias, change in bowel habit, chest pain, congestion, coughing, fever, headaches, myalgias, rash, sore throat, swollen glands or vertigo  Nothing aggravates the symptoms  She has tried nothing for the symptoms  The following portions of the patient's history were reviewed and updated as appropriate: allergies, current medications, past family history, past medical history, past social history, past surgical history and problem list     Review of Systems   Constitutional: Negative  Negative for fever  HENT: Negative  Negative for congestion and sore throat  Eyes: Negative  Respiratory: Negative for cough, chest tightness, shortness of breath and wheezing  Cardiovascular: Positive for palpitations  Negative for chest pain  Gastrointestinal: Negative  Negative for anorexia and change in bowel habit  Endocrine: Negative  Genitourinary: Negative  Musculoskeletal: Negative  Negative for arthralgias and myalgias  Skin: Negative  Negative for color change and rash  Allergic/Immunologic: Negative  Neurological: Positive for weakness  Negative for vertigo and headaches  Hematological: Negative  Psychiatric/Behavioral: Negative            Objective:      /80 (BP Location: Left arm, Patient Position: Sitting, Cuff Size: Adult)   Pulse 93   Temp 98 2 °F (36 8 °C) (Temporal)   Ht 5' 1 5" (1 562 m)   Wt 73 2 kg (161 lb 6 4 oz)   SpO2 97%   BMI 30 00 kg/m²          Physical Exam Constitutional: She is oriented to person, place, and time  She appears well-developed and well-nourished  No distress  HENT:   Head: Normocephalic and atraumatic  Right Ear: External ear normal    Left Ear: External ear normal    Eyes: Pupils are equal, round, and reactive to light  Conjunctivae and EOM are normal    Neck: Normal range of motion  Neck supple  Cardiovascular: Normal rate, regular rhythm, normal heart sounds and intact distal pulses  Pulmonary/Chest: Effort normal and breath sounds normal  No respiratory distress  She has no wheezes  She has no rales  Abdominal: Soft  Bowel sounds are normal    Musculoskeletal: Normal range of motion  Lymphadenopathy:     She has no cervical adenopathy  Neurological: She is alert and oriented to person, place, and time  She has normal reflexes  Skin: Skin is warm and dry  Capillary refill takes less than 2 seconds  She is not diaphoretic  Psychiatric: She has a normal mood and affect  Thought content normal    Nursing note and vitals reviewed

## 2019-09-23 ENCOUNTER — HOSPITAL ENCOUNTER (OUTPATIENT)
Dept: NON INVASIVE DIAGNOSTICS | Facility: HOSPITAL | Age: 54
Discharge: HOME | End: 2019-09-23
Payer: COMMERCIAL

## 2019-09-23 DIAGNOSIS — R00.2 PALPITATIONS: ICD-10-CM

## 2019-09-23 PROCEDURE — 93226 XTRNL ECG REC<48 HR SCAN A/R: CPT

## 2019-09-23 PROCEDURE — 93225 XTRNL ECG REC<48 HRS REC: CPT

## 2019-09-27 PROCEDURE — 93227 XTRNL ECG REC<48 HR R&I: CPT | Performed by: INTERNAL MEDICINE

## 2019-10-09 DIAGNOSIS — R00.2 PALPITATIONS: Primary | ICD-10-CM

## 2020-06-02 ENCOUNTER — TELEPHONE (OUTPATIENT)
Dept: FAMILY MEDICINE CLINIC | Facility: CLINIC | Age: 55
End: 2020-06-02

## 2021-01-28 ENCOUNTER — OFFICE VISIT (OUTPATIENT)
Dept: FAMILY MEDICINE CLINIC | Facility: CLINIC | Age: 56
End: 2021-01-28
Payer: COMMERCIAL

## 2021-01-28 VITALS
DIASTOLIC BLOOD PRESSURE: 80 MMHG | SYSTOLIC BLOOD PRESSURE: 120 MMHG | RESPIRATION RATE: 18 BRPM | WEIGHT: 164 LBS | OXYGEN SATURATION: 98 % | TEMPERATURE: 97.1 F | HEART RATE: 130 BPM | HEIGHT: 64 IN | BODY MASS INDEX: 28 KG/M2

## 2021-01-28 DIAGNOSIS — E78.2 MIXED HYPERLIPIDEMIA: ICD-10-CM

## 2021-01-28 DIAGNOSIS — Z12.11 ENCOUNTER FOR SCREENING COLONOSCOPY: ICD-10-CM

## 2021-01-28 DIAGNOSIS — Z12.4 SCREENING FOR CERVICAL CANCER: ICD-10-CM

## 2021-01-28 DIAGNOSIS — R53.83 OTHER FATIGUE: ICD-10-CM

## 2021-01-28 DIAGNOSIS — N39.0 URINARY TRACT INFECTION WITH HEMATURIA, SITE UNSPECIFIED: Primary | ICD-10-CM

## 2021-01-28 DIAGNOSIS — R31.9 URINARY TRACT INFECTION WITH HEMATURIA, SITE UNSPECIFIED: Primary | ICD-10-CM

## 2021-01-28 DIAGNOSIS — R73.9 HYPERGLYCEMIA: ICD-10-CM

## 2021-01-28 DIAGNOSIS — R35.0 URINARY FREQUENCY: ICD-10-CM

## 2021-01-28 DIAGNOSIS — Z12.31 ENCOUNTER FOR SCREENING MAMMOGRAM FOR MALIGNANT NEOPLASM OF BREAST: ICD-10-CM

## 2021-01-28 LAB
SL AMB  POCT GLUCOSE, UA: NEGATIVE
SL AMB LEUKOCYTE ESTERASE,UA: NEGATIVE
SL AMB POCT BILIRUBIN,UA: NEGATIVE
SL AMB POCT BLOOD,UA: NORMAL
SL AMB POCT CLARITY,UA: NORMAL
SL AMB POCT COLOR,UA: NORMAL
SL AMB POCT KETONES,UA: NORMAL
SL AMB POCT NITRITE,UA: NEGATIVE
SL AMB POCT PH,UA: 5
SL AMB POCT SPECIFIC GRAVITY,UA: 1.02
SL AMB POCT URINE PROTEIN: NEGATIVE
SL AMB POCT UROBILINOGEN: 0.2

## 2021-01-28 PROCEDURE — 3725F SCREEN DEPRESSION PERFORMED: CPT | Performed by: NURSE PRACTITIONER

## 2021-01-28 PROCEDURE — 3008F BODY MASS INDEX DOCD: CPT | Performed by: NURSE PRACTITIONER

## 2021-01-28 PROCEDURE — 99214 OFFICE O/P EST MOD 30 MIN: CPT | Performed by: NURSE PRACTITIONER

## 2021-01-28 PROCEDURE — 87086 URINE CULTURE/COLONY COUNT: CPT | Performed by: NURSE PRACTITIONER

## 2021-01-28 PROCEDURE — 1036F TOBACCO NON-USER: CPT | Performed by: NURSE PRACTITIONER

## 2021-01-28 PROCEDURE — 81002 URINALYSIS NONAUTO W/O SCOPE: CPT | Performed by: NURSE PRACTITIONER

## 2021-01-28 RX ORDER — NITROFURANTOIN 25; 75 MG/1; MG/1
100 CAPSULE ORAL 2 TIMES DAILY
Qty: 10 CAPSULE | Refills: 0 | Status: SHIPPED | OUTPATIENT
Start: 2021-01-28 | End: 2021-02-02

## 2021-01-28 NOTE — PROGRESS NOTES
BMI Counseling: Body mass index is 28 15 kg/m²  The BMI is above normal  Nutrition recommendations include encouraging healthy choices of fruits and vegetables, decreasing fast food intake, consuming healthier snacks, limiting drinks that contain sugar, increasing intake of lean protein, reducing intake of saturated and trans fat and reducing intake of cholesterol  Exercise recommendations include exercising 3-5 times per week  Depression Screening and Follow-up Plan: Patient's depression screening was positive with a PHQ-2 score of 0  Clincally patient does not have depression  No treatment is required  Assessment/Plan:    Urinary tract infection with hematuria  Discussed with patient goal of treatment is to eradicate the bacteria  Today I recommended use of medications like Pyridium to help decrease bladder spasms  Encouraged increase in fluid intake to help flush out bacteria  I also advised on use of cranberry juice of cranberry pills to help maintain healthy kidney function  Sizt bath can help decrease her pain  Filling the bath or tub with 4 to 6 inches of warm water and sitting for 20 minutes 2-3 times a day  Prescription for antibiotic given today and sending urine for culture  Will call patient with results  Diagnoses and all orders for this visit:    Urinary tract infection with hematuria, site unspecified  -     nitrofurantoin (MACROBID) 100 mg capsule; Take 1 capsule (100 mg total) by mouth 2 (two) times a day for 5 days    Screening for cervical cancer  -     Ambulatory referral to Gynecology; Future    Encounter for screening mammogram for malignant neoplasm of breast  -     Mammo screening bilateral w 3d & cad; Future    Encounter for screening colonoscopy  -     Cologuard; Future    Urinary frequency  -     POCT urine dip  -     Urine culture    Other fatigue  -     CBC and differential; Future  -     TSH, 3rd generation with Free T4 reflex;  Future    Mixed hyperlipidemia  - Lipid panel; Future    Hyperglycemia  -     Comprehensive metabolic panel; Future    Other orders  -     Cancel: influenza vaccine, quadrivalent, recombinant, PF, 0 5 mL, for patients 18 yr+ (FLUBLOK)          Subjective:      Patient ID: Nelson Henderson is a 64 y o  female  Urinary Frequency   This is a recurrent problem  The current episode started yesterday  The problem occurs every urination  The problem has been gradually worsening  The quality of the pain is described as aching  The pain is at a severity of 2/10  The pain is mild  There has been no fever  The fever has been present for less than 1 day  She is sexually active  There is no history of pyelonephritis  Associated symptoms include frequency and urgency  Pertinent negatives include no chills, discharge, flank pain, hesitancy, nausea, possible pregnancy or vomiting  She has tried nothing for the symptoms  The treatment provided no relief  Her past medical history is significant for recurrent UTIs  There is no history of catheterization, kidney stones, urinary stasis or a urological procedure  The following portions of the patient's history were reviewed and updated as appropriate: allergies, current medications, past family history, past medical history, past social history, past surgical history and problem list     Review of Systems   Constitutional: Negative  Negative for chills and fever  HENT: Negative  Eyes: Negative  Respiratory: Negative  Negative for cough, chest tightness, shortness of breath and wheezing  Cardiovascular: Negative  Negative for chest pain and palpitations  Gastrointestinal: Positive for abdominal distention  Negative for nausea and vomiting  Endocrine: Negative  Genitourinary: Positive for frequency and urgency  Negative for flank pain and hesitancy  Musculoskeletal: Negative  Skin: Negative  Allergic/Immunologic: Negative  Neurological: Negative  Hematological: Negative  Psychiatric/Behavioral: Negative  Objective:      /80 (BP Location: Left arm, Patient Position: Sitting, Cuff Size: Standard)   Pulse (!) 130   Temp (!) 97 1 °F (36 2 °C) (Temporal)   Resp 18   Ht 5' 4" (1 626 m)   Wt 74 4 kg (164 lb)   SpO2 98%   BMI 28 15 kg/m²          Physical Exam  Vitals signs and nursing note reviewed  Constitutional:       General: She is not in acute distress  Appearance: Normal appearance  She is not ill-appearing  HENT:      Head: Normocephalic and atraumatic  Right Ear: External ear normal       Left Ear: External ear normal       Nose: Nose normal       Mouth/Throat:      Mouth: Mucous membranes are moist       Pharynx: Oropharynx is clear  Eyes:      Conjunctiva/sclera: Conjunctivae normal    Neck:      Musculoskeletal: Normal range of motion and neck supple  Cardiovascular:      Rate and Rhythm: Normal rate and regular rhythm  Pulses: Normal pulses  Heart sounds: Normal heart sounds  Pulmonary:      Effort: Pulmonary effort is normal  No respiratory distress  Breath sounds: Normal breath sounds  Abdominal:      General: Bowel sounds are normal       Palpations: Abdomen is soft  Tenderness: There is abdominal tenderness in the suprapubic area  There is no right CVA tenderness, left CVA tenderness, guarding or rebound  Musculoskeletal: Normal range of motion  Skin:     General: Skin is warm and dry  Capillary Refill: Capillary refill takes less than 2 seconds  Neurological:      General: No focal deficit present  Mental Status: She is alert and oriented to person, place, and time     Psychiatric:         Mood and Affect: Mood normal          Behavior: Behavior normal                Chief Complaint   Patient presents with    Urinary Tract Infection

## 2021-01-28 NOTE — ASSESSMENT & PLAN NOTE
Discussed with patient goal of treatment is to eradicate the bacteria  Today I recommended use of medications like Pyridium to help decrease bladder spasms  Encouraged increase in fluid intake to help flush out bacteria  I also advised on use of cranberry juice of cranberry pills to help maintain healthy kidney function  Sizt bath can help decrease her pain  Filling the bath or tub with 4 to 6 inches of warm water and sitting for 20 minutes 2-3 times a day  Prescription for antibiotic given today and sending urine for culture  Will call patient with results

## 2021-01-29 LAB — BACTERIA UR CULT: NORMAL

## 2021-02-10 ENCOUNTER — TELEPHONE (OUTPATIENT)
Dept: ADMINISTRATIVE | Facility: OTHER | Age: 56
End: 2021-02-10

## 2021-02-10 NOTE — TELEPHONE ENCOUNTER
Upon review of the In Basket request we were able to locate, review, and update the patient chart as requested for Pap Smear (HPV) aka Cervical Cancer Screening  Any additional questions or concerns should be emailed to the Practice Liaisons via [email or web address] email, please do not reply via In Basket      Thank you  Rolo Woo

## 2021-02-10 NOTE — TELEPHONE ENCOUNTER
----- Message from Josiah Hennessy sent at 2/9/2021  2:59 PM EST -----  Regarding: cervical screening  02/09/21 2:59 PM    Hello, our patient No patient name on file  has had Pap Smear (HPV) aka Cervical Cancer Screening completed/performed  Please assist in updating the patient chart by pulling the document from the Media Tab  The date of service is 3/17/20       Thank you,  Isabella Calix   [address] [facility] [address] [facility] [address]

## 2021-02-20 ENCOUNTER — LAB (OUTPATIENT)
Dept: LAB | Facility: HOSPITAL | Age: 56
End: 2021-02-20
Payer: COMMERCIAL

## 2021-02-20 DIAGNOSIS — E78.2 MIXED HYPERLIPIDEMIA: ICD-10-CM

## 2021-02-20 DIAGNOSIS — R73.9 HYPERGLYCEMIA: ICD-10-CM

## 2021-02-20 DIAGNOSIS — R53.83 OTHER FATIGUE: ICD-10-CM

## 2021-02-20 LAB
ALBUMIN SERPL BCP-MCNC: 4.1 G/DL (ref 3–5.2)
ALP SERPL-CCNC: 97 U/L (ref 43–122)
ALT SERPL W P-5'-P-CCNC: 34 U/L (ref 9–52)
ANION GAP SERPL CALCULATED.3IONS-SCNC: 9 MMOL/L (ref 5–14)
AST SERPL W P-5'-P-CCNC: 29 U/L (ref 14–36)
BASOPHILS # BLD AUTO: 0.1 THOUSANDS/ΜL (ref 0–0.1)
BASOPHILS NFR BLD AUTO: 2 % (ref 0–1)
BILIRUB SERPL-MCNC: 0.7 MG/DL
BUN SERPL-MCNC: 17 MG/DL (ref 5–25)
CALCIUM SERPL-MCNC: 9.3 MG/DL (ref 8.4–10.2)
CHLORIDE SERPL-SCNC: 105 MMOL/L (ref 97–108)
CHOLEST SERPL-MCNC: 229 MG/DL
CO2 SERPL-SCNC: 29 MMOL/L (ref 22–30)
CREAT SERPL-MCNC: 0.71 MG/DL (ref 0.6–1.2)
EOSINOPHIL # BLD AUTO: 0.2 THOUSAND/ΜL (ref 0–0.4)
EOSINOPHIL NFR BLD AUTO: 4 % (ref 0–6)
ERYTHROCYTE [DISTWIDTH] IN BLOOD BY AUTOMATED COUNT: 14.4 %
GFR SERPL CREATININE-BSD FRML MDRD: 96 ML/MIN/1.73SQ M
GLUCOSE P FAST SERPL-MCNC: 94 MG/DL (ref 70–99)
HCT VFR BLD AUTO: 42.3 % (ref 36–46)
HDLC SERPL-MCNC: 50 MG/DL
HGB BLD-MCNC: 13.4 G/DL (ref 12–16)
LDLC SERPL CALC-MCNC: 144 MG/DL
LYMPHOCYTES # BLD AUTO: 1.9 THOUSANDS/ΜL (ref 0.5–4)
LYMPHOCYTES NFR BLD AUTO: 28 % (ref 25–45)
MCH RBC QN AUTO: 26.7 PG (ref 26–34)
MCHC RBC AUTO-ENTMCNC: 31.6 G/DL (ref 31–36)
MCV RBC AUTO: 85 FL (ref 80–100)
MONOCYTES # BLD AUTO: 0.5 THOUSAND/ΜL (ref 0.2–0.9)
MONOCYTES NFR BLD AUTO: 8 % (ref 1–10)
NEUTROPHILS # BLD AUTO: 4.2 THOUSANDS/ΜL (ref 1.8–7.8)
NEUTS SEG NFR BLD AUTO: 60 % (ref 45–65)
NONHDLC SERPL-MCNC: 179 MG/DL
PLATELET # BLD AUTO: 284 THOUSANDS/UL (ref 150–450)
PMV BLD AUTO: 8.1 FL (ref 8.9–12.7)
POTASSIUM SERPL-SCNC: 3.7 MMOL/L (ref 3.6–5)
PROT SERPL-MCNC: 7.5 G/DL (ref 5.9–8.4)
RBC # BLD AUTO: 5 MILLION/UL (ref 4–5.2)
SODIUM SERPL-SCNC: 143 MMOL/L (ref 137–147)
TRIGL SERPL-MCNC: 173 MG/DL
TSH SERPL DL<=0.05 MIU/L-ACNC: 3.29 UIU/ML (ref 0.47–4.68)
WBC # BLD AUTO: 7 THOUSAND/UL (ref 4.5–11)

## 2021-02-20 PROCEDURE — 36415 COLL VENOUS BLD VENIPUNCTURE: CPT

## 2021-02-20 PROCEDURE — 80053 COMPREHEN METABOLIC PANEL: CPT

## 2021-02-20 PROCEDURE — 80061 LIPID PANEL: CPT

## 2021-02-20 PROCEDURE — 85025 COMPLETE CBC W/AUTO DIFF WBC: CPT

## 2021-02-20 PROCEDURE — 84443 ASSAY THYROID STIM HORMONE: CPT

## 2021-03-01 ENCOUNTER — OFFICE VISIT (OUTPATIENT)
Dept: FAMILY MEDICINE CLINIC | Facility: CLINIC | Age: 56
End: 2021-03-01
Payer: COMMERCIAL

## 2021-03-01 VITALS
BODY MASS INDEX: 28.68 KG/M2 | OXYGEN SATURATION: 97 % | WEIGHT: 168 LBS | TEMPERATURE: 97.7 F | RESPIRATION RATE: 18 BRPM | HEART RATE: 94 BPM | HEIGHT: 64 IN | SYSTOLIC BLOOD PRESSURE: 140 MMHG | DIASTOLIC BLOOD PRESSURE: 80 MMHG

## 2021-03-01 DIAGNOSIS — Z00.00 ANNUAL PHYSICAL EXAM: Primary | ICD-10-CM

## 2021-03-01 DIAGNOSIS — Z85.9 HISTORY OF CANCER: ICD-10-CM

## 2021-03-01 DIAGNOSIS — Z23 NEED FOR TDAP VACCINATION: ICD-10-CM

## 2021-03-01 DIAGNOSIS — Z23 NEEDS FLU SHOT: ICD-10-CM

## 2021-03-01 DIAGNOSIS — Z82.49 FAMILY HISTORY OF CARDIAC DISORDER IN FATHER: ICD-10-CM

## 2021-03-01 DIAGNOSIS — E55.9 VITAMIN D DEFICIENCY: ICD-10-CM

## 2021-03-01 DIAGNOSIS — Z11.59 ENCOUNTER FOR HEPATITIS C SCREENING TEST FOR LOW RISK PATIENT: ICD-10-CM

## 2021-03-01 PROCEDURE — 1036F TOBACCO NON-USER: CPT | Performed by: NURSE PRACTITIONER

## 2021-03-01 PROCEDURE — 90471 IMMUNIZATION ADMIN: CPT | Performed by: NURSE PRACTITIONER

## 2021-03-01 PROCEDURE — 90686 IIV4 VACC NO PRSV 0.5 ML IM: CPT | Performed by: NURSE PRACTITIONER

## 2021-03-01 PROCEDURE — 90472 IMMUNIZATION ADMIN EACH ADD: CPT | Performed by: NURSE PRACTITIONER

## 2021-03-01 PROCEDURE — 99396 PREV VISIT EST AGE 40-64: CPT | Performed by: NURSE PRACTITIONER

## 2021-03-01 PROCEDURE — 90715 TDAP VACCINE 7 YRS/> IM: CPT | Performed by: NURSE PRACTITIONER

## 2021-03-01 PROCEDURE — 3008F BODY MASS INDEX DOCD: CPT | Performed by: NURSE PRACTITIONER

## 2021-03-01 RX ORDER — PRENATAL VIT/IRON FUM/FOLIC AC 27 MG-1 MG
1 TABLET ORAL DAILY
Qty: 30 EACH | Refills: 5 | Status: SHIPPED | OUTPATIENT
Start: 2021-03-01 | End: 2022-03-14 | Stop reason: SDUPTHER

## 2021-03-01 NOTE — PROGRESS NOTES
[address] PRIMARY CARE [facility]    NAME: Rubén Lopez  AGE: 64 y o  SEX: female  : 1965     DATE: 3/1/2021     Assessment and Plan:     Problem List Items Addressed This Visit        Other    Annual physical exam - Primary     -Patient recommended healthy eating habits  Health risk assessment was discussed with patient also and the ways to stay healthier  Recommended a exercising frequently at least 5 days a week for 30 minutes at a time; such as joining a gym if not already enrolled or walking  Eating low-fat and low-cholesterol foods with avoidance of saturated fats or fried foods  Immunizations, and the need to comply with current CDC's recommendations were discussed  To follow up yearly for physical exams  Needs flu shot    Relevant Orders    influenza vaccine, quadrivalent, 0 5 mL, preservative-free, for adult and pediatric patients 6 mos+ (AFLURIA, FLUARIX, FLULAVAL, FLUZONE)    Need for Tdap vaccination    Relevant Orders    TDAP VACCINE GREATER THAN OR EQUAL TO 8YO IM    Encounter for hepatitis C screening test for low risk patient    Relevant Orders    Hepatitis C antibody    Vitamin D deficiency    Relevant Orders    Vitamin D 25 hydroxy    Family history of cardiac disorder in father    Relevant Orders    Ambulatory referral to Cardiology    History of cancer    Relevant Medications    Prenatal Vit-Fe Fumarate-FA (Prenatal/Folic Acid) TABS          Immunizations and preventive care screenings were discussed with patient today  Appropriate education was printed on patient's after visit summary  Counseling:  Alcohol/drug use: discussed moderation in alcohol intake, the recommendations for healthy alcohol use, and avoidance of illicit drug use  Dental Health: discussed importance of regular tooth brushing, flossing, and dental visits    Injury prevention: discussed safety/seat belts, safety helmets, smoke detectors, carbon dioxide detectors, and smoking near bedding or upholstery  Sexual health: discussed sexually transmitted diseases, partner selection, use of condoms, avoidance of unintended pregnancy, and contraceptive alternatives  · Exercise: the importance of regular exercise/physical activity was discussed  Recommend exercise 3-5 times per week for at least 30 minutes  BMI Counseling: Body mass index is 28 84 kg/m²  The BMI is above normal  Nutrition recommendations include encouraging healthy choices of fruits and vegetables, decreasing fast food intake, consuming healthier snacks, limiting drinks that contain sugar, increasing intake of lean protein, reducing intake of saturated and trans fat and reducing intake of cholesterol  Exercise recommendations include exercising 3-5 times per week  Depression Screening and Follow-up Plan: Clincally patient does not have depression  No treatment is required  Return in about 1 month (around 4/1/2021) for Recheck with Dr Vicki Fernandez  Chief Complaint:     Chief Complaint   Patient presents with    Physical Exam      History of Present Illness:     Adult Annual Physical   Patient here for a comprehensive physical exam  The patient reports no problems  Diet and Physical Activity  · Diet/Nutrition: well balanced diet, limited junk food and consuming 3-5 servings of fruits/vegetables daily  · Exercise: no formal exercise  Depression Screening  PHQ-9 Depression Screening    PHQ-9:   Frequency of the following problems over the past two weeks:           General Health  · Sleep: sleeps well  · Hearing: normal - bilateral   · Vision: goes for regular eye exams and wears glasses  · Dental: regular dental visits, brushes teeth once daily and flosses teeth occasionally  /GYN Health  · Patient is: perimenopausal  · Last menstrual period: 2017  · Contraceptive method: hysterectomy       Review of Systems:     Review of Systems Constitutional: Positive for unexpected weight change  Negative for fever  HENT: Negative  Negative for congestion  Eyes: Negative  Respiratory: Negative  Negative for cough, chest tightness, shortness of breath and wheezing  Cardiovascular: Negative  Negative for chest pain and palpitations  Gastrointestinal: Negative  Endocrine: Negative  Genitourinary: Negative  Musculoskeletal: Positive for back pain  Skin: Negative  Allergic/Immunologic: Negative  Neurological: Negative  Negative for dizziness and headaches  Hematological: Negative  Psychiatric/Behavioral: Negative         Past Medical History:     Past Medical History:   Diagnosis Date    Breast mass     right mass-needle loc w/lumpectomy today    Cancer ([facility] [address] )     Wears glasses       Past Surgical History:     Past Surgical History:   Procedure Laterality Date    BREAST SURGERY       SECTION      x2    DIAGNOSTIC LAPAROSCOPY      MAMMO NEEDLE LOCALIZATION RIGHT (ALL [employer]) Right 2016    MAMMO NEEDLE LOCALIZATION RIGHT (ALL [employer]) EACH ADD Right 2016    MAMMO STEREOTACTIC BREAST BIOPSY RIGHT (ALL INC) Right 2016    ME MASTECTOMY, PARTIAL Right 2016    Procedure: LUMPECTOMY RIGHT BREAST WITH FAXITRON;  Surgeon: Lo Villa MD;  Location: [facility];  Service: Surgical Oncology    TUBAL LIGATION        Social History:        Social History     Socioeconomic History    Marital status: /Civil Union     Spouse name: None    Number of children: None    Years of education: None    Highest education level: None   Occupational History    None   Social Needs    Financial resource strain: None    Food insecurity     Worry: None     Inability: None    Transportation needs     Medical: None     Non-medical: None   Tobacco Use    Smoking status: Never Smoker    Smokeless tobacco: Never Used   Substance and Sexual Activity    Alcohol use: No    Drug use: No    Sexual activity: Yes Partners: Male   Lifestyle    Physical activity     Days per week: None     Minutes per session: None    Stress: None   Relationships    Social connections     Talks on phone: None     Gets together: None     Attends Denominational service: None     Active member of club or organization: None     Attends meetings of clubs or organizations: None     Relationship status: None    Intimate partner violence     Fear of current or ex partner: None     Emotionally abused: None     Physically abused: None     Forced sexual activity: None   Other Topics Concern    None   Social History Narrative    None      Family History:     Family History   Problem Relation Age of Onset    Mental illness Mother     Alzheimer's disease Mother     Coronary artery disease Father     Hypertension Sister     Cancer Sister     Hypertension Brother     Cancer Brother     No Known Problems Son     Mental illness Daughter       Current Medications:     Current Outpatient Medications   Medication Sig Dispense Refill    letrozole (FEMARA) 2 5 mg tablet       tamoxifen (NOLVADEX) 20 mg tablet Take 20 mg by mouth      Prenatal Vit-Fe Fumarate-FA (Prenatal/Folic Acid) TABS Take 1 tablet by mouth daily 30 each 5     No current facility-administered medications for this visit        Facility-Administered Medications Ordered in Other Visits   Medication Dose Route Frequency Provider Last Rate Last Admin    lidocaine (XYLOCAINE) 1 % injection 4 mL  4 mL Infiltration Once Rajinder Schmid MD        lidocaine-epinephrine (XYLOCAINE/EPINEPHRINE) 2 %-1:100,000 injection 9 mL  9 mL Infiltration Once Rajinder Schmid MD          Allergies:     No Known Allergies   Physical Exam:     /80 (BP Location: Left arm, Patient Position: Sitting, Cuff Size: Standard)   Pulse 94   Temp 97 7 °F (36 5 °C) (Temporal)   Resp 18   Ht 5' 4" (1 626 m)   Wt 76 2 kg (168 lb)   SpO2 97%   BMI 28 84 kg/m²     Physical Exam  Vitals signs and nursing note reviewed  Constitutional:       General: She is not in acute distress  Appearance: Normal appearance  She is not ill-appearing, toxic-appearing or diaphoretic  HENT:      Head: Normocephalic and atraumatic  Right Ear: Tympanic membrane, ear canal and external ear normal  There is no impacted cerumen  Left Ear: Tympanic membrane, ear canal and external ear normal  There is no impacted cerumen  Nose: Nose normal  No congestion or rhinorrhea  Mouth/Throat:      Mouth: Mucous membranes are moist       Pharynx: Oropharynx is clear  No oropharyngeal exudate or posterior oropharyngeal erythema  Eyes:      Extraocular Movements: Extraocular movements intact  Conjunctiva/sclera: Conjunctivae normal       Pupils: Pupils are equal, round, and reactive to light  Neck:      Musculoskeletal: Normal range of motion and neck supple  Cardiovascular:      Rate and Rhythm: Normal rate and regular rhythm  Pulses: Normal pulses  Heart sounds: Normal heart sounds  Pulmonary:      Effort: Pulmonary effort is normal       Breath sounds: Normal breath sounds  Abdominal:      General: Bowel sounds are normal       Palpations: Abdomen is soft  Musculoskeletal: Normal range of motion  General: No tenderness or deformity  Skin:     General: Skin is warm and dry  Capillary Refill: Capillary refill takes less than 2 seconds  Neurological:      General: No focal deficit present  Mental Status: She is alert and oriented to person, place, and time     Psychiatric:         Mood and Affect: Mood normal          Behavior: Behavior normal          Judgment: Judgment normal           [place] [facility], CRNP  [address]

## 2021-03-01 NOTE — PATIENT INSTRUCTIONS

## 2021-03-03 ENCOUNTER — TELEPHONE (OUTPATIENT)
Dept: ADMINISTRATIVE | Facility: OTHER | Age: 56
End: 2021-03-03

## 2021-05-12 ENCOUNTER — CONSULT (OUTPATIENT)
Dept: CARDIOLOGY CLINIC | Facility: CLINIC | Age: 56
End: 2021-05-12
Payer: COMMERCIAL

## 2021-05-12 VITALS
DIASTOLIC BLOOD PRESSURE: 78 MMHG | WEIGHT: 162.4 LBS | SYSTOLIC BLOOD PRESSURE: 122 MMHG | HEART RATE: 84 BPM | RESPIRATION RATE: 16 BRPM | BODY MASS INDEX: 27.72 KG/M2 | HEIGHT: 64 IN

## 2021-05-12 DIAGNOSIS — Z82.49 FAMILY HISTORY OF CARDIAC DISORDER IN FATHER: Primary | ICD-10-CM

## 2021-05-12 DIAGNOSIS — R00.2 PALPITATIONS: ICD-10-CM

## 2021-05-12 PROCEDURE — 93000 ELECTROCARDIOGRAM COMPLETE: CPT | Performed by: INTERNAL MEDICINE

## 2021-05-12 PROCEDURE — 1036F TOBACCO NON-USER: CPT | Performed by: INTERNAL MEDICINE

## 2021-05-12 PROCEDURE — 99204 OFFICE O/P NEW MOD 45 MIN: CPT | Performed by: INTERNAL MEDICINE

## 2021-05-12 PROCEDURE — 3008F BODY MASS INDEX DOCD: CPT | Performed by: INTERNAL MEDICINE

## 2021-05-12 NOTE — PROGRESS NOTES
Cardiology Follow Up    Loida Beltre  1965  [id number]  [address]  [address]  [facility] [address]  [phone]    1  Family history of cardiac disorder in father  Ambulatory referral to Cardiology    POCT ECG    Echo stress test w contrast if indicated   2  Palpitations         Interval History:  A 59-year-old female with no previous cardiac history presents today for evaluation of atypical chest pain and palpitations  She had a Holter monitor in 2019 for palpitations that was unremarkable she has similar symptoms at this time perhaps once or twice weekly she will feel some heartbeats for a couple of seconds typically rapid at times feeling as if they are skipping and they would just go away spontaneously  When she has the her associated with  discomfort  She denies any dyspnea on exertion orthopnea paroxysmal nocturnal dyspnea or syncope  She does not drink caffeinated beverages  She has a family history of coronary artery disease      Patient Active Problem List   Diagnosis    Chronic interstitial cystitis without hematuria    DCIS (ductal carcinoma in situ) of breast    Palpitations    S/P partial mastectomy, right    Hyperglycemia    Screening for cholesterol level    Urinary tract infection with hematuria    Screening for cervical cancer    Encounter for screening mammogram for malignant neoplasm of breast    Encounter for screening colonoscopy    Urinary frequency    Other fatigue    Mixed hyperlipidemia    Annual physical exam    Needs flu shot    Need for Tdap vaccination    Encounter for hepatitis C screening test for low risk patient    Vitamin D deficiency    Family history of cardiac disorder in father    History of cancer     Past Medical History:   Diagnosis Date    Breast mass     right mass-needle loc w/lumpectomy today    Cancer ([address] )     Wears glasses      Social History Socioeconomic History    Marital status: /Civil Union     Spouse name: Not on file    Number of children: Not on file    Years of education: Not on file    Highest education level: Not on file   Occupational History    Not on file   Social Needs    Financial resource strain: Not on file    Food insecurity     Worry: Not on file     Inability: Not on file    Transportation needs     Medical: Not on file     Non-medical: Not on file   Tobacco Use    Smoking status: Never Smoker    Smokeless tobacco: Never Used   Substance and Sexual Activity    Alcohol use: No    Drug use: No    Sexual activity: Yes     Partners: Male   Lifestyle    Physical activity     Days per week: Not on file     Minutes per session: Not on file    Stress: Not on file   Relationships    Social connections     Talks on phone: Not on file     Gets together: Not on file     Attends Yazidism service: Not on file     Active member of club or organization: Not on file     Attends meetings of clubs or organizations: Not on file     Relationship status: Not on file    Intimate partner violence     Fear of current or ex partner: Not on file     Emotionally abused: Not on file     Physically abused: Not on file     Forced sexual activity: Not on file   Other Topics Concern    Not on file   Social History Narrative    Not on file      Family History   Problem Relation Age of Onset    Mental illness Mother     Alzheimer's disease Mother     Coronary artery disease Father     Hypertension Sister     Cancer Sister     Hypertension Brother     Cancer Brother     No Known Problems Son     Mental illness Daughter      Past Surgical History:   Procedure Laterality Date    BREAST SURGERY       SECTION      x2    DIAGNOSTIC LAPAROSCOPY      MAMMO NEEDLE LOCALIZATION RIGHT (ALL INC) Right 2016    MAMMO NEEDLE LOCALIZATION RIGHT (ALL INC) EACH ADD Right 2016    MAMMO STEREOTACTIC BREAST BIOPSY RIGHT (ALL INC) Right 1/18/2016    [place] MASTECTOMY, PARTIAL Right 2/8/2016    Procedure: LUMPECTOMY RIGHT BREAST WITH FAXITRON;  Surgeon: Elvis Light MD;  Location: [facility];  Service: Surgical Oncology    TUBAL LIGATION         Current Outpatient Medications:     letrozole (FEMARA) 2 5 mg tablet, , Disp: , Rfl:     Prenatal Vit-Fe Fumarate-FA (Prenatal/Folic Acid) TABS, Take 1 tablet by mouth daily, Disp: 30 each, Rfl: 5    tamoxifen (NOLVADEX) 20 mg tablet, Take 20 mg by mouth, Disp: , Rfl:   No current facility-administered medications for this visit  Facility-Administered Medications Ordered in Other Visits:     lidocaine (XYLOCAINE) 1 % injection 4 mL, 4 mL, Infiltration, Once, Shelby Bermeo MD    lidocaine-epinephrine (XYLOCAINE/EPINEPHRINE) 2 %-1:100,000 injection 9 mL, 9 mL, Infiltration, Once, Shelby Bermeo MD  No Known Allergies    Labs:  No visits with results within 2 Month(s) from this visit     Latest known visit with results is:   Lab on 02/20/2021   Component Date Value    WBC 02/20/2021 7 00     RBC 02/20/2021 5 00     Hemoglobin 02/20/2021 13 4     Hematocrit 02/20/2021 42 3     MCV 02/20/2021 85     MCH 02/20/2021 26 7     MCHC 02/20/2021 31 6     RDW 02/20/2021 14 4     MPV 02/20/2021 8 1*    Platelets 60/96/1186 284     Neutrophils Relative 02/20/2021 60     Lymphocytes Relative 02/20/2021 28     Monocytes Relative 02/20/2021 8     Eosinophils Relative 02/20/2021 4     Basophils Relative 02/20/2021 2*    Neutrophils Absolute 02/20/2021 4 20     Lymphocytes Absolute 02/20/2021 1 90     Monocytes Absolute 02/20/2021 0 50     Eosinophils Absolute 02/20/2021 0 20     Basophils Absolute 02/20/2021 0 10     Sodium 02/20/2021 143     Potassium 02/20/2021 3 7     Chloride 02/20/2021 105     CO2 02/20/2021 29     ANION GAP 02/20/2021 9     BUN 02/20/2021 17     Creatinine 02/20/2021 0 71     Glucose, Fasting 02/20/2021 94     Calcium 02/20/2021 9 3     AST 02/20/2021 29  ALT 02/20/2021 34     Alkaline Phosphatase 02/20/2021 97     Total Protein 02/20/2021 7 5     Albumin 02/20/2021 4 1     Total Bilirubin 02/20/2021 0 70     eGFR 02/20/2021 96     Cholesterol 02/20/2021 229*    Triglycerides 02/20/2021 173*    HDL, Direct 02/20/2021 50     LDL Calculated 02/20/2021 144*    Non-HDL-Chol (CHOL-HDL) 02/20/2021 179     TSH 3RD GENERATON 02/20/2021 3 290      Imaging: No results found  Review of Systems:  Review of Systems   Constitutional: Negative for fatigue  HENT: Negative for hearing loss  Eyes: Negative for discharge  Respiratory: Negative for shortness of breath  Cardiovascular: Negative for chest pain, palpitations and leg swelling  Gastrointestinal: Negative for abdominal pain  Endocrine: Negative for polyuria  Genitourinary: Negative for dysuria  Musculoskeletal: Negative for back pain and myalgias  Skin: Negative for rash  Neurological: Negative for syncope  Hematological: Does not bruise/bleed easily  Psychiatric/Behavioral: Negative for confusion and sleep disturbance  Physical Exam:  Physical Exam  Vitals signs and nursing note reviewed  Constitutional:       Appearance: She is well-developed  HENT:      Head: Normocephalic and atraumatic  Neck:      Musculoskeletal: Normal range of motion and neck supple  Vascular: No JVD  Cardiovascular:      Rate and Rhythm: Normal rate and regular rhythm  Heart sounds: Normal heart sounds  Pulmonary:      Effort: Pulmonary effort is normal       Breath sounds: Normal breath sounds  Abdominal:      General: Bowel sounds are normal       Palpations: Abdomen is soft  Musculoskeletal: Normal range of motion  Skin:     General: Skin is warm and dry  Neurological:      Mental Status: She is alert and oriented to person, place, and time  Psychiatric:         Behavior: Behavior normal          Thought Content:  Thought content normal          Judgment: Judgment normal          Discussion/Summary:  Brief intermittent relatively infrequent palpitations and atypical chest pain  Holter monitor couple years back as described 12 lead EKG in the office today shows normal sinus rhythm and unremarkable  I will order a stress echocardiogram to assess left ventricular size and function and exclude obstructive coronary disease  I think part of this may be anxiety related to her family history of coronary disease  I will see her back the office by further recommendations

## 2021-09-20 ENCOUNTER — OFFICE VISIT (OUTPATIENT)
Dept: FAMILY MEDICINE CLINIC | Facility: CLINIC | Age: 56
End: 2021-09-20
Payer: COMMERCIAL

## 2021-09-20 VITALS
BODY MASS INDEX: 28.34 KG/M2 | HEIGHT: 64 IN | TEMPERATURE: 97.8 F | SYSTOLIC BLOOD PRESSURE: 122 MMHG | OXYGEN SATURATION: 98 % | HEART RATE: 96 BPM | DIASTOLIC BLOOD PRESSURE: 80 MMHG | WEIGHT: 166 LBS | RESPIRATION RATE: 16 BRPM

## 2021-09-20 DIAGNOSIS — R42 DIZZINESS: Primary | ICD-10-CM

## 2021-09-20 DIAGNOSIS — E78.2 MIXED HYPERLIPIDEMIA: ICD-10-CM

## 2021-09-20 PROCEDURE — 3008F BODY MASS INDEX DOCD: CPT | Performed by: NURSE PRACTITIONER

## 2021-09-20 PROCEDURE — 1036F TOBACCO NON-USER: CPT | Performed by: NURSE PRACTITIONER

## 2021-09-20 PROCEDURE — 99214 OFFICE O/P EST MOD 30 MIN: CPT | Performed by: NURSE PRACTITIONER

## 2021-09-20 PROCEDURE — 3725F SCREEN DEPRESSION PERFORMED: CPT | Performed by: NURSE PRACTITIONER

## 2021-09-20 RX ORDER — MECLIZINE HYDROCHLORIDE 25 MG/1
25 TABLET ORAL 3 TIMES DAILY PRN
Qty: 90 TABLET | Refills: 0 | Status: SHIPPED | OUTPATIENT
Start: 2021-09-20 | End: 2022-08-05

## 2021-09-20 NOTE — ASSESSMENT & PLAN NOTE
-on exam neurologically normal  Based on symptoms I am recommending movements that help relieve the symptoms and keep the dizziness from returning  These exercises help move the calcium pieces to a different part of the patients ear  Advised patient to avoid sudden head movements and raise and support their head when they lie down  Also recommended to change position often when she is lying down in bed  Today we are starting on meclizine 25mg TID until symptoms resolve  If s/s worsen or fail to resolve to call office

## 2021-09-20 NOTE — PROGRESS NOTES
Assessment/Plan:    Dizziness  -on exam neurologically normal  Based on symptoms I am recommending movements that help relieve the symptoms and keep the dizziness from returning  These exercises help move the calcium pieces to a different part of the patients ear  Advised patient to avoid sudden head movements and raise and support their head when they lie down  Also recommended to change position often when she is lying down in bed  Today we are starting on meclizine 25mg TID until symptoms resolve  If s/s worsen or fail to resolve to call office  Diagnoses and all orders for this visit:    Dizziness  -     meclizine (ANTIVERT) 25 mg tablet; Take 1 tablet (25 mg total) by mouth 3 (three) times a day as needed for dizziness or nausea    Mixed hyperlipidemia  -     Lipid panel; Future          Subjective:      Patient ID: Joon Joy is a 64 y o  female  64year old female patient here for follow up on dizziness  States she suffers from this years ago since [address]  She was told in the past that the crystals need to balance and she was practicing exercises without much relief  Now her dizziness is worsening  States when she lays down and getting she feels like everything is going to fall down  In the mornings she feels pressure in her head but denies headaches  Denies any seasonal allergies  Has nausea but no vomiting  Her dizziness restarted about 1 month now per patient  Dizziness  This is a new problem  The current episode started more than 1 month ago  The problem occurs constantly  The problem has been gradually worsening  Associated symptoms include nausea and vertigo  Pertinent negatives include no chest pain, congestion, coughing, fever, headaches, numbness, urinary symptoms or vomiting  The symptoms are aggravated by bending, standing and walking  She has tried nothing for the symptoms         The following portions of the patient's history were reviewed and updated as appropriate: allergies, current medications, past family history, past medical history, past social history, past surgical history and problem list     Review of Systems   Constitutional: Negative  Negative for appetite change and fever  HENT: Negative  Negative for congestion  Eyes: Negative  Respiratory: Negative  Negative for cough, chest tightness, shortness of breath and wheezing  Cardiovascular: Negative  Negative for chest pain and palpitations  Gastrointestinal: Positive for nausea  Negative for vomiting  Endocrine: Negative  Genitourinary: Negative  Musculoskeletal: Negative  Skin: Negative  Allergic/Immunologic: Negative  Neurological: Positive for dizziness and vertigo  Negative for numbness and headaches  Hematological: Negative  Psychiatric/Behavioral: Negative  Objective:      /80 (BP Location: Right arm, Patient Position: Sitting, Cuff Size: Standard)   Pulse 96   Temp 97 8 °F (36 6 °C) (Temporal)   Resp 16   Ht 5' 4" (1 626 m)   Wt 75 3 kg (166 lb)   SpO2 98%   BMI 28 49 kg/m²          Physical Exam  Vitals and nursing note reviewed  Constitutional:       General: She is not in acute distress  Appearance: Normal appearance  She is not ill-appearing  HENT:      Head: Normocephalic and atraumatic  Right Ear: External ear normal       Left Ear: External ear normal       Nose: Nose normal  No congestion or rhinorrhea  Mouth/Throat:      Pharynx: Oropharynx is clear  No oropharyngeal exudate  Eyes:      Extraocular Movements: Extraocular movements intact  Conjunctiva/sclera: Conjunctivae normal       Pupils: Pupils are equal, round, and reactive to light  Cardiovascular:      Rate and Rhythm: Normal rate and regular rhythm  Pulses: Normal pulses  Heart sounds: Normal heart sounds  Pulmonary:      Effort: Pulmonary effort is normal  No respiratory distress  Breath sounds: Normal breath sounds     Abdominal:      General: Bowel sounds are normal       Palpations: Abdomen is soft  Musculoskeletal:         General: Normal range of motion  Cervical back: Normal range of motion and neck supple  Skin:     General: Skin is warm and dry  Capillary Refill: Capillary refill takes less than 2 seconds  Neurological:      General: No focal deficit present  Mental Status: She is alert and oriented to person, place, and time  Comments: 5/5 strength in upper/lower extremities, sensation intact throughout, cerebellar testing including finger-to-nose heel-to-shin rapid alternating movements are intact, cranial nerves 2-12 intact  No pronator drift  Patient is able to ambulate without difficulty  Visual fields are intact  Speech is articulate     Psychiatric:         Mood and Affect: Mood normal          Behavior: Behavior normal          Chief Complaint   Patient presents with    Dizziness

## 2022-03-03 ENCOUNTER — OFFICE VISIT (OUTPATIENT)
Dept: FAMILY MEDICINE CLINIC | Facility: CLINIC | Age: 57
End: 2022-03-03
Payer: COMMERCIAL

## 2022-03-03 VITALS
TEMPERATURE: 97.6 F | OXYGEN SATURATION: 99 % | WEIGHT: 168 LBS | RESPIRATION RATE: 18 BRPM | BODY MASS INDEX: 28.68 KG/M2 | HEART RATE: 103 BPM | HEIGHT: 64 IN | SYSTOLIC BLOOD PRESSURE: 126 MMHG | DIASTOLIC BLOOD PRESSURE: 80 MMHG

## 2022-03-03 DIAGNOSIS — Z11.4 ENCOUNTER FOR SCREENING FOR HIV: ICD-10-CM

## 2022-03-03 DIAGNOSIS — Z00.00 ANNUAL PHYSICAL EXAM: Primary | ICD-10-CM

## 2022-03-03 DIAGNOSIS — M85.89 OSTEOPENIA OF MULTIPLE SITES: ICD-10-CM

## 2022-03-03 DIAGNOSIS — Z12.31 ENCOUNTER FOR SCREENING MAMMOGRAM FOR MALIGNANT NEOPLASM OF BREAST: ICD-10-CM

## 2022-03-03 DIAGNOSIS — R73.9 HYPERGLYCEMIA: ICD-10-CM

## 2022-03-03 DIAGNOSIS — E78.2 MIXED HYPERLIPIDEMIA: ICD-10-CM

## 2022-03-03 DIAGNOSIS — E55.9 VITAMIN D DEFICIENCY: ICD-10-CM

## 2022-03-03 DIAGNOSIS — R53.83 OTHER FATIGUE: ICD-10-CM

## 2022-03-03 DIAGNOSIS — Z11.59 ENCOUNTER FOR HEPATITIS C SCREENING TEST FOR LOW RISK PATIENT: ICD-10-CM

## 2022-03-03 PROCEDURE — 3008F BODY MASS INDEX DOCD: CPT | Performed by: NURSE PRACTITIONER

## 2022-03-03 PROCEDURE — 99396 PREV VISIT EST AGE 40-64: CPT | Performed by: NURSE PRACTITIONER

## 2022-03-03 PROCEDURE — 1036F TOBACCO NON-USER: CPT | Performed by: NURSE PRACTITIONER

## 2022-03-03 NOTE — PROGRESS NOTES
[address] PRIMARY CARE [facility]    NAME: Guanakito Kiran  AGE: 62 y o  SEX: female  : 1965     DATE: 3/3/2022     Assessment and Plan:     Problem List Items Addressed This Visit        Musculoskeletal and Integument    Osteopenia of multiple sites    Relevant Orders    DXA bone density spine hip and pelvis       Other    Hyperglycemia    Relevant Orders    Comprehensive metabolic panel    Encounter for screening mammogram for malignant neoplasm of breast    Relevant Orders    Mammo screening bilateral w 3d & cad    Other fatigue    Relevant Orders    CBC and differential    Mixed hyperlipidemia    Relevant Orders    Lipid panel    Annual physical exam - Primary     Patient is here for physical exam we find this visit without any distress or abnormalities  We recommended exercise at least three and 0 5 hours per week and healthy diet with a low carbohydrate and low saturated fat  Began to follow up in one year for regular physical exams  Encounter for hepatitis C screening test for low risk patient    Relevant Orders    Hepatitis C antibody    Vitamin D deficiency    Relevant Orders    Vitamin D 25 hydroxy    Encounter for screening for HIV    Relevant Orders    HIV 1/2 Antigen/Antibody (4th Generation) w Reflex [facility]          Immunizations and preventive care screenings were discussed with patient today  Appropriate education was printed on patient's after visit summary  Counseling:  Alcohol/drug use: discussed moderation in alcohol intake, the recommendations for healthy alcohol use, and avoidance of illicit drug use  Dental Health: discussed importance of regular tooth brushing, flossing, and dental visits  Injury prevention: discussed safety/seat belts, safety helmets, smoke detectors, carbon dioxide detectors, and smoking near bedding or upholstery    Sexual health: discussed sexually transmitted diseases, partner selection, use of condoms, avoidance of unintended pregnancy, and contraceptive alternatives  · Exercise: the importance of regular exercise/physical activity was discussed  Recommend exercise 3-5 times per week for at least 30 minutes  BMI Counseling: Body mass index is 28 84 kg/m²  The BMI is above normal  Nutrition recommendations include encouraging healthy choices of fruits and vegetables, decreasing fast food intake, consuming healthier snacks, limiting drinks that contain sugar, increasing intake of lean protein, reducing intake of saturated and trans fat and reducing intake of cholesterol  Exercise recommendations include exercising 3-5 times per week  Rationale for BMI follow-up plan is due to patient being overweight or obese  Depression Screening and Follow-up Plan: Clincally patient does not have depression  No treatment is required  No follow-ups on file  Chief Complaint:     Chief Complaint   Patient presents with    Physical Exam      History of Present Illness:     Adult Annual Physical   Patient here for a comprehensive physical exam  The patient reports no problems  Diet and Physical Activity  · Diet/Nutrition: well balanced diet and consuming 3-5 servings of fruits/vegetables daily  · Exercise: 3-4 times a week on average  Depression Screening  PHQ-2/9 Depression Screening         General Health  · Sleep: sleeps well  · Hearing: normal - bilateral   · Vision: goes for regular eye exams  · Dental: regular dental visits and brushes teeth twice daily  /GYN Health  · Patient is: postmenopausal  · Last menstrual period: 2017  · Contraceptive method: tubal ligation  Review of Systems:     Review of Systems   Constitutional: Negative  Negative for appetite change and fever  HENT: Negative  Eyes: Negative  Respiratory: Negative  Negative for cough, chest tightness, shortness of breath and wheezing  Cardiovascular: Negative    Negative for chest pain and palpitations  Gastrointestinal: Negative  Endocrine: Negative  Genitourinary: Negative  Musculoskeletal: Negative  Negative for arthralgias  Skin: Negative  Allergic/Immunologic: Negative  Neurological: Negative  Negative for dizziness and headaches  Hematological: Negative  Psychiatric/Behavioral: Negative         Past Medical History:     Past Medical History:   Diagnosis Date    Breast mass     right mass-needle loc w/lumpectomy today    Cancer [address] [address]     Wears glasses       Past Surgical History:     Past Surgical History:   Procedure Laterality Date    BREAST SURGERY       SECTION      x2    DIAGNOSTIC LAPAROSCOPY      MAMMO NEEDLE LOCALIZATION RIGHT (ALL INC) Right 2016    MAMMO NEEDLE LOCALIZATION RIGHT (ALL INC) EACH ADD Right 2016    MAMMO STEREOTACTIC BREAST BIOPSY RIGHT (ALL INC) Right 2016    [place] MASTECTOMY, PARTIAL Right 2016    Procedure: LUMPECTOMY RIGHT BREAST WITH FAXITRON;  Surgeon: Rod Knox MD;  Location: [facility] OR;  Service: Surgical Oncology    TUBAL LIGATION        Social History:     Social History     Socioeconomic History    Marital status: /Civil Union     Spouse name: None    Number of children: None    Years of education: None    Highest education level: None   Occupational History    None   Tobacco Use    Smoking status: Never Smoker    Smokeless tobacco: Never Used   Substance and Sexual Activity    Alcohol use: No    Drug use: No    Sexual activity: Yes     Partners: Male   Other Topics Concern    None   Social History Narrative    None     Social Determinants of Health     Financial Resource Strain: Not on file   Food Insecurity: Not on file   Transportation Needs: Not on file   Physical Activity: Not on file   Stress: Not on file   Social Connections: Not on file   Intimate Partner Violence: Not on file   Housing Stability: Not on file      Family History:     Family History   Problem Relation Age of Onset    Mental illness Mother     Alzheimer's disease Mother     Coronary artery disease Father     Hypertension Sister     Cancer Sister     Hypertension Brother     Cancer Brother     No Known Problems Son     Mental illness Daughter       Current Medications:     Current Outpatient Medications   Medication Sig Dispense Refill    ascorbic acid (VITAMIN C) 1000 MG tablet Take 1,000 mg by mouth daily      Cholecalciferol 25 MCG (1000 UT) tablet Take 1,000 Units by mouth daily      ferrous sulfate 325 (65 Fe) mg tablet Take 325 mg by mouth      meclizine (ANTIVERT) 25 mg tablet Take 1 tablet (25 mg total) by mouth 3 (three) times a day as needed for dizziness or nausea 90 tablet 0    Prenatal Vit-Fe Fumarate-FA (Prenatal/Folic Acid) TABS Take 1 tablet by mouth daily 30 each 5    vitamin E, tocopherol, 1,000 units capsule Take 1,000 Units by mouth daily       No current facility-administered medications for this visit  Facility-Administered Medications Ordered in Other Visits   Medication Dose Route Frequency Provider Last Rate Last Admin    lidocaine (XYLOCAINE) 1 % injection 4 mL  4 mL Infiltration Once Owen Barry MD        lidocaine-epinephrine (XYLOCAINE/EPINEPHRINE) 2 %-1:100,000 injection 9 mL  9 mL Infiltration Once Owen Barry MD          Allergies:     No Known Allergies   Physical Exam:     /80 (BP Location: Left arm, Patient Position: Sitting, Cuff Size: Standard)   Pulse 103   Temp 97 6 °F (36 4 °C) (Temporal)   Resp 18   Ht 5' 4" (1 626 m)   Wt 76 2 kg (168 lb)   SpO2 99%   BMI 28 84 kg/m²     Physical Exam  Vitals and nursing note reviewed  Constitutional:       General: She is not in acute distress  Appearance: Normal appearance  She is not ill-appearing  HENT:      Head: Normocephalic and atraumatic  Right Ear: Tympanic membrane, ear canal and external ear normal  There is no impacted cerumen        Left Ear: Tympanic membrane, ear canal and external ear normal  There is no impacted cerumen  Nose: Nose normal  No congestion or rhinorrhea  Mouth/Throat:      Mouth: Mucous membranes are moist       Pharynx: Oropharynx is clear  No oropharyngeal exudate or posterior oropharyngeal erythema  Eyes:      Extraocular Movements: Extraocular movements intact  Conjunctiva/sclera: Conjunctivae normal       Pupils: Pupils are equal, round, and reactive to light  Cardiovascular:      Rate and Rhythm: Normal rate  Pulses: Normal pulses  Heart sounds: Normal heart sounds  Pulmonary:      Effort: Pulmonary effort is normal       Breath sounds: Normal breath sounds  Abdominal:      General: Bowel sounds are normal       Palpations: Abdomen is soft  Musculoskeletal:         General: No tenderness or deformity  Normal range of motion  Cervical back: Normal range of motion and neck supple  Skin:     General: Skin is warm and dry  Capillary Refill: Capillary refill takes less than 2 seconds  Neurological:      General: No focal deficit present  Mental Status: She is alert and oriented to person, place, and time     Psychiatric:         Mood and Affect: Mood normal          Behavior: Behavior normal           Estefanía Kyle, [address]

## 2022-03-03 NOTE — PATIENT INSTRUCTIONS

## 2022-03-08 ENCOUNTER — APPOINTMENT (OUTPATIENT)
Dept: LAB | Facility: HOSPITAL | Age: 57
End: 2022-03-08
Payer: COMMERCIAL

## 2022-03-08 DIAGNOSIS — R73.9 HYPERGLYCEMIA: ICD-10-CM

## 2022-03-08 DIAGNOSIS — E55.9 VITAMIN D DEFICIENCY: ICD-10-CM

## 2022-03-08 DIAGNOSIS — Z11.59 ENCOUNTER FOR HEPATITIS C SCREENING TEST FOR LOW RISK PATIENT: ICD-10-CM

## 2022-03-08 DIAGNOSIS — E78.2 MIXED HYPERLIPIDEMIA: ICD-10-CM

## 2022-03-08 DIAGNOSIS — R53.83 OTHER FATIGUE: ICD-10-CM

## 2022-03-08 DIAGNOSIS — Z11.4 ENCOUNTER FOR SCREENING FOR HIV: ICD-10-CM

## 2022-03-08 LAB
25(OH)D3 SERPL-MCNC: 20.2 NG/ML (ref 30–100)
ALBUMIN SERPL BCP-MCNC: 4.3 G/DL (ref 3–5.2)
ALP SERPL-CCNC: 104 U/L (ref 43–122)
ALT SERPL W P-5'-P-CCNC: 27 U/L
ANION GAP SERPL CALCULATED.3IONS-SCNC: 9 MMOL/L (ref 5–14)
AST SERPL W P-5'-P-CCNC: 27 U/L (ref 14–36)
BASOPHILS # BLD AUTO: 0 THOUSANDS/ΜL (ref 0–0.1)
BASOPHILS NFR BLD AUTO: 1 % (ref 0–1)
BILIRUB SERPL-MCNC: 0.59 MG/DL
BUN SERPL-MCNC: 16 MG/DL (ref 5–25)
CALCIUM SERPL-MCNC: 8.9 MG/DL (ref 8.4–10.2)
CHLORIDE SERPL-SCNC: 108 MMOL/L (ref 97–108)
CHOLEST SERPL-MCNC: 233 MG/DL
CO2 SERPL-SCNC: 24 MMOL/L (ref 22–30)
CREAT SERPL-MCNC: 0.71 MG/DL (ref 0.6–1.2)
EOSINOPHIL # BLD AUTO: 0.2 THOUSAND/ΜL (ref 0–0.4)
EOSINOPHIL NFR BLD AUTO: 4 % (ref 0–6)
ERYTHROCYTE [DISTWIDTH] IN BLOOD BY AUTOMATED COUNT: 14.4 %
GFR SERPL CREATININE-BSD FRML MDRD: 94 ML/MIN/1.73SQ M
GLUCOSE P FAST SERPL-MCNC: 98 MG/DL (ref 70–99)
HCT VFR BLD AUTO: 42.7 % (ref 36–46)
HCV AB SER QL: NORMAL
HDLC SERPL-MCNC: 68 MG/DL
HGB BLD-MCNC: 13.8 G/DL (ref 12–16)
LDLC SERPL CALC-MCNC: 145 MG/DL
LYMPHOCYTES # BLD AUTO: 1.8 THOUSANDS/ΜL (ref 0.5–4)
LYMPHOCYTES NFR BLD AUTO: 29 % (ref 25–45)
MCH RBC QN AUTO: 26.8 PG (ref 26–34)
MCHC RBC AUTO-ENTMCNC: 32.2 G/DL (ref 31–36)
MCV RBC AUTO: 83 FL (ref 80–100)
MONOCYTES # BLD AUTO: 0.5 THOUSAND/ΜL (ref 0.2–0.9)
MONOCYTES NFR BLD AUTO: 9 % (ref 1–10)
NEUTROPHILS # BLD AUTO: 3.6 THOUSANDS/ΜL (ref 1.8–7.8)
NEUTS SEG NFR BLD AUTO: 58 % (ref 45–65)
NONHDLC SERPL-MCNC: 165 MG/DL
PLATELET # BLD AUTO: 277 THOUSANDS/UL (ref 150–450)
PMV BLD AUTO: 8.1 FL (ref 8.9–12.7)
POTASSIUM SERPL-SCNC: 3.7 MMOL/L (ref 3.6–5)
PROT SERPL-MCNC: 8 G/DL (ref 5.9–8.4)
RBC # BLD AUTO: 5.14 MILLION/UL (ref 4–5.2)
SODIUM SERPL-SCNC: 141 MMOL/L (ref 137–147)
TRIGL SERPL-MCNC: 101 MG/DL
WBC # BLD AUTO: 6.2 THOUSAND/UL (ref 4.5–11)

## 2022-03-08 PROCEDURE — 86803 HEPATITIS C AB TEST: CPT

## 2022-03-08 PROCEDURE — 85025 COMPLETE CBC W/AUTO DIFF WBC: CPT

## 2022-03-08 PROCEDURE — 80061 LIPID PANEL: CPT

## 2022-03-08 PROCEDURE — 80053 COMPREHEN METABOLIC PANEL: CPT

## 2022-03-08 PROCEDURE — 36415 COLL VENOUS BLD VENIPUNCTURE: CPT

## 2022-03-08 PROCEDURE — 82306 VITAMIN D 25 HYDROXY: CPT

## 2022-03-08 PROCEDURE — 87389 HIV-1 AG W/HIV-1&-2 AB AG IA: CPT

## 2022-03-09 LAB — HIV 1+2 AB+HIV1 P24 AG SERPL QL IA: NORMAL

## 2022-03-14 DIAGNOSIS — Z85.9 HISTORY OF CANCER: ICD-10-CM

## 2022-03-15 RX ORDER — PRENATAL VIT/IRON FUM/FOLIC AC 27 MG-1 MG
1 TABLET ORAL DAILY
Qty: 90 TABLET | Refills: 1 | Status: SHIPPED | OUTPATIENT
Start: 2022-03-15 | End: 2022-07-22 | Stop reason: SDUPTHER

## 2022-07-15 DIAGNOSIS — R42 DIZZINESS: ICD-10-CM

## 2022-07-15 DIAGNOSIS — Z85.9 HISTORY OF CANCER: ICD-10-CM

## 2022-07-18 RX ORDER — PRENATAL VIT/IRON FUM/FOLIC AC 27 MG-1 MG
1 TABLET ORAL DAILY
Qty: 90 TABLET | Refills: 1 | OUTPATIENT
Start: 2022-07-18

## 2022-07-22 DIAGNOSIS — Z85.9 HISTORY OF CANCER: ICD-10-CM

## 2022-07-22 RX ORDER — PRENATAL VIT/IRON FUM/FOLIC AC 27 MG-1 MG
1 TABLET ORAL DAILY
Qty: 90 TABLET | Refills: 1 | Status: SHIPPED | OUTPATIENT
Start: 2022-07-22

## 2022-08-05 ENCOUNTER — OFFICE VISIT (OUTPATIENT)
Dept: FAMILY MEDICINE CLINIC | Facility: CLINIC | Age: 57
End: 2022-08-05
Payer: COMMERCIAL

## 2022-08-05 VITALS
TEMPERATURE: 97.8 F | OXYGEN SATURATION: 98 % | SYSTOLIC BLOOD PRESSURE: 120 MMHG | RESPIRATION RATE: 16 BRPM | HEART RATE: 78 BPM | HEIGHT: 64 IN | BODY MASS INDEX: 29.19 KG/M2 | DIASTOLIC BLOOD PRESSURE: 70 MMHG | WEIGHT: 171 LBS

## 2022-08-05 DIAGNOSIS — J04.0 LARYNGITIS: ICD-10-CM

## 2022-08-05 DIAGNOSIS — J01.00 ACUTE NON-RECURRENT MAXILLARY SINUSITIS: Primary | ICD-10-CM

## 2022-08-05 DIAGNOSIS — J02.9 SORE THROAT: ICD-10-CM

## 2022-08-05 PROCEDURE — 87255 GENET VIRUS ISOLATE HSV: CPT | Performed by: FAMILY MEDICINE

## 2022-08-05 PROCEDURE — 99213 OFFICE O/P EST LOW 20 MIN: CPT | Performed by: FAMILY MEDICINE

## 2022-08-05 PROCEDURE — 3725F SCREEN DEPRESSION PERFORMED: CPT | Performed by: FAMILY MEDICINE

## 2022-08-05 RX ORDER — CETIRIZINE HYDROCHLORIDE, PSEUDOEPHEDRINE HYDROCHLORIDE 5; 120 MG/1; MG/1
1 TABLET, FILM COATED, EXTENDED RELEASE ORAL 2 TIMES DAILY
Qty: 14 TABLET | Refills: 0 | Status: SHIPPED | OUTPATIENT
Start: 2022-08-05

## 2022-08-05 RX ORDER — AMOXICILLIN 875 MG/1
875 TABLET, COATED ORAL 2 TIMES DAILY
Qty: 14 TABLET | Refills: 0 | Status: SHIPPED | OUTPATIENT
Start: 2022-08-05 | End: 2022-08-12

## 2022-08-05 NOTE — PROGRESS NOTES
Assessment/Plan:    No problem-specific Assessment & Plan notes found for this encounter  Diagnoses and all orders for this visit:    Acute non-recurrent maxillary sinusitis  -     amoxicillin (AMOXIL) 875 mg tablet; Take 1 tablet (875 mg total) by mouth 2 (two) times a day for 7 days  -     cetirizine-pseudoephedrine (ZyrTEC-D) 5-120 MG per tablet; Take 1 tablet by mouth 2 (two) times a day    Sore throat    Laryngitis  -     Cancel: Herpes simplex virus culture; Future  -     Herpes simplex virus culture; Future  -     Herpes simplex virus culture          Subjective:      Patient ID: Son Constantino is a 62 y o  female  HPI  Patient  complains of cough congestion, sneezing, sore throat and swollen glands for 2 week(s)  Patient denies a history of chest pain and chills and denies a history of asthma  Patient denies smoke cigarettes  Patient tried OTC medications  Feeling pressure and pain in the sinus areas  Sore throat and hoarseness      The following portions of the patient's history were reviewed and updated as appropriate: allergies, current medications, past family history, past medical history, past social history, past surgical history and problem list     Review of Systems      Objective:      /70 (BP Location: Left arm, Patient Position: Sitting, Cuff Size: Standard)   Pulse 78   Temp 97 8 °F (36 6 °C) (Temporal)   Resp 16   Ht 5' 4" (1 626 m)   Wt 77 6 kg (171 lb)   SpO2 98%   Breastfeeding No   BMI 29 35 kg/m²          Physical Exam

## 2022-08-09 ENCOUNTER — DOCUMENTATION (OUTPATIENT)
Dept: FAMILY MEDICINE CLINIC | Facility: CLINIC | Age: 57
End: 2022-08-09

## 2022-08-09 LAB — HSV SPEC CULT: NORMAL

## 2022-09-08 ENCOUNTER — HOSPITAL ENCOUNTER (OUTPATIENT)
Dept: MAMMOGRAPHY | Facility: MEDICAL CENTER | Age: 57
Discharge: HOME | End: 2022-09-08
Payer: COMMERCIAL

## 2022-09-08 VITALS — HEIGHT: 64 IN | BODY MASS INDEX: 29.21 KG/M2 | WEIGHT: 171.08 LBS

## 2022-09-08 DIAGNOSIS — Z12.31 ENCOUNTER FOR SCREENING MAMMOGRAM FOR MALIGNANT NEOPLASM OF BREAST: ICD-10-CM

## 2022-09-08 PROCEDURE — 77063 BREAST TOMOSYNTHESIS BI: CPT

## 2022-09-08 PROCEDURE — 77067 SCR MAMMO BI INCL CAD: CPT

## 2022-09-23 ENCOUNTER — HOSPITAL ENCOUNTER (OUTPATIENT)
Dept: BONE DENSITY | Facility: CLINIC | Age: 57
Discharge: HOME | End: 2022-09-23

## 2022-09-23 DIAGNOSIS — M85.89 OSTEOPENIA OF MULTIPLE SITES: ICD-10-CM

## 2022-10-12 PROBLEM — R31.9 URINARY TRACT INFECTION WITH HEMATURIA: Status: RESOLVED | Noted: 2021-01-28 | Resolved: 2022-10-12

## 2022-10-12 PROBLEM — N39.0 URINARY TRACT INFECTION WITH HEMATURIA: Status: RESOLVED | Noted: 2021-01-28 | Resolved: 2022-10-12

## 2022-10-12 PROBLEM — Z12.4 SCREENING FOR CERVICAL CANCER: Status: RESOLVED | Noted: 2021-01-28 | Resolved: 2022-10-12

## 2023-01-05 ENCOUNTER — OFFICE VISIT (OUTPATIENT)
Dept: FAMILY MEDICINE CLINIC | Facility: CLINIC | Age: 58
End: 2023-01-05

## 2023-01-05 VITALS
WEIGHT: 171 LBS | HEIGHT: 64 IN | DIASTOLIC BLOOD PRESSURE: 80 MMHG | RESPIRATION RATE: 16 BRPM | TEMPERATURE: 98 F | SYSTOLIC BLOOD PRESSURE: 126 MMHG | OXYGEN SATURATION: 98 % | BODY MASS INDEX: 29.19 KG/M2 | HEART RATE: 92 BPM

## 2023-01-05 DIAGNOSIS — M25.522 BILATERAL ELBOW JOINT PAIN: Primary | ICD-10-CM

## 2023-01-05 DIAGNOSIS — Z11.59 SCREENING FOR VIRAL DISEASE: ICD-10-CM

## 2023-01-05 DIAGNOSIS — M25.521 BILATERAL ELBOW JOINT PAIN: Primary | ICD-10-CM

## 2023-01-05 RX ORDER — DICLOFENAC SODIUM 75 MG/1
75 TABLET, DELAYED RELEASE ORAL 2 TIMES DAILY
Qty: 60 TABLET | Refills: 2 | Status: SHIPPED | OUTPATIENT
Start: 2023-01-05

## 2023-01-05 NOTE — PROGRESS NOTES
Name: Blayne Eng      : 1965      MRN: [id number]  Encounter Provider: Sarai Lugo MD  Encounter Date: 2023   Encounter department: [facility]   Requested patient to get warm compresses to improve circulation in both elbows  Asking opinion ortho regarding treatment beside anti-inflammatory one   1  Bilateral elbow joint pain  -     diclofenac (VOLTAREN) 75 mg EC tablet; Take 1 tablet (75 mg total) by mouth 2 (two) times a day  -     XR elbow 2 vw left; Future; Expected date: 2023  -     XR elbow 2 vw right; Future; Expected date: 2023    2  BMI 29 0-29 9,adult    3  Screening for viral disease  -     Hepatitis B surface antibody; Future    BMI Counseling: Body mass index is 29 35 kg/m²  The BMI is above normal  Nutrition recommendations include decreasing soda and/or juice intake and moderation in carbohydrate intake  Exercise recommendations include exercising 3-5 times per week  Subjective      Blayne Eng 62 y o  complaining of   Chronic pain in bilaterally and elbows; started about 6 month(s) ago  The pain is continuos and has been gradually worsening since onset  The quality of the pain is described as aching  The pain radiates down arms  The pain is at a severity of 5/10  The symptoms are aggravated by movement when she helps her disabled 29 y o  daughter with muscle dystrophy     Associated symptoms include numbness, paresthesias and tingling  She tried tylenol for pain  Review of Systems   Musculoskeletal: Positive for arthralgias (of elbows  )         Current Outpatient Medications on File Prior to Visit   Medication Sig   • cetirizine-pseudoephedrine (ZyrTEC-D) 5-120 MG per tablet Take 1 tablet by mouth 2 (two) times a day   • Prenatal Vit-Fe Fumarate-FA (Prenatal/Folic Acid) TABS Take 1 tablet by mouth daily       Objective     /80 (BP Location: Left arm, Patient Position: Sitting, Cuff Size: Standard)   Pulse 92   Temp 98 °F (36 7 °C) (Tympanic)   Resp 16   Ht 5' 4" (1 626 m)   Wt 77 6 kg (171 lb)   SpO2 98%   BMI 29 35 kg/m²     Physical Exam  Constitutional:       Appearance: Normal appearance  Musculoskeletal:         General: Tenderness (bilateral lateral epicondilar ) present         Yessica Latham MD

## 2023-01-28 ENCOUNTER — APPOINTMENT (OUTPATIENT)
Dept: LAB | Facility: HOSPITAL | Age: 58
End: 2023-01-28

## 2023-01-28 ENCOUNTER — HOSPITAL ENCOUNTER (OUTPATIENT)
Dept: RADIOLOGY | Facility: HOSPITAL | Age: 58
Discharge: HOME | End: 2023-01-28

## 2023-01-28 DIAGNOSIS — Z11.59 SCREENING FOR VIRAL DISEASE: ICD-10-CM

## 2023-01-28 DIAGNOSIS — M25.522 BILATERAL ELBOW JOINT PAIN: ICD-10-CM

## 2023-01-28 DIAGNOSIS — M25.521 BILATERAL ELBOW JOINT PAIN: ICD-10-CM

## 2023-01-28 LAB — HBV SURFACE AB SER-ACNC: 175.3 MIU/ML

## 2023-02-03 ENCOUNTER — TELEPHONE (OUTPATIENT)
Dept: FAMILY MEDICINE CLINIC | Facility: CLINIC | Age: 58
End: 2023-02-03

## 2023-02-03 NOTE — TELEPHONE ENCOUNTER
----- Message from Baltazar Wood MD sent at 2/3/2023  2:57 PM EST -----  Elbow have sprus, if continue with pain to make an appointment with ortho

## 2023-04-12 PROBLEM — R42 DIZZINESS: Status: RESOLVED | Noted: 2021-09-20 | Resolved: 2023-04-12

## 2023-04-12 PROBLEM — R35.0 URINARY FREQUENCY: Status: RESOLVED | Noted: 2021-01-28 | Resolved: 2023-04-12

## 2023-04-12 PROBLEM — R00.2 PALPITATIONS: Status: RESOLVED | Noted: 2019-09-17 | Resolved: 2023-04-12

## 2023-05-23 ENCOUNTER — OFFICE VISIT (OUTPATIENT)
Dept: FAMILY MEDICINE CLINIC | Facility: CLINIC | Age: 58
End: 2023-05-23

## 2023-05-23 ENCOUNTER — APPOINTMENT (OUTPATIENT)
Dept: LAB | Facility: HOSPITAL | Age: 58
End: 2023-05-23

## 2023-05-23 VITALS
DIASTOLIC BLOOD PRESSURE: 82 MMHG | RESPIRATION RATE: 17 BRPM | TEMPERATURE: 99.1 F | HEART RATE: 100 BPM | SYSTOLIC BLOOD PRESSURE: 128 MMHG | OXYGEN SATURATION: 98 % | HEIGHT: 64 IN | WEIGHT: 170 LBS | BODY MASS INDEX: 29.02 KG/M2

## 2023-05-23 DIAGNOSIS — Z78.0 POST-MENOPAUSAL: ICD-10-CM

## 2023-05-23 DIAGNOSIS — E78.2 MIXED HYPERLIPIDEMIA: ICD-10-CM

## 2023-05-23 DIAGNOSIS — E55.9 VITAMIN D DEFICIENCY: ICD-10-CM

## 2023-05-23 DIAGNOSIS — N63.42 SUBAREOLAR MASS OF LEFT BREAST: Primary | ICD-10-CM

## 2023-05-23 DIAGNOSIS — Z85.9 HISTORY OF CANCER: ICD-10-CM

## 2023-05-23 DIAGNOSIS — Z13.6 SCREENING FOR CARDIOVASCULAR CONDITION: ICD-10-CM

## 2023-05-23 DIAGNOSIS — D05.11 DUCTAL CARCINOMA IN SITU (DCIS) OF RIGHT BREAST: ICD-10-CM

## 2023-05-23 LAB
25(OH)D3 SERPL-MCNC: 22.2 NG/ML (ref 30–100)
ALBUMIN SERPL BCP-MCNC: 4.3 G/DL (ref 3.5–5)
ALP SERPL-CCNC: 89 U/L (ref 34–104)
ALT SERPL W P-5'-P-CCNC: 30 U/L (ref 7–52)
ANION GAP SERPL CALCULATED.3IONS-SCNC: 9 MMOL/L (ref 4–13)
AST SERPL W P-5'-P-CCNC: 23 U/L (ref 13–39)
BASOPHILS # BLD AUTO: 0.03 THOUSANDS/ÂΜL (ref 0–0.1)
BASOPHILS NFR BLD AUTO: 1 % (ref 0–1)
BILIRUB SERPL-MCNC: 0.69 MG/DL (ref 0.2–1)
BUN SERPL-MCNC: 15 MG/DL (ref 5–25)
CALCIUM SERPL-MCNC: 9.1 MG/DL (ref 8.4–10.2)
CHLORIDE SERPL-SCNC: 106 MMOL/L (ref 96–108)
CHOLEST SERPL-MCNC: 192 MG/DL
CO2 SERPL-SCNC: 26 MMOL/L (ref 21–32)
CREAT SERPL-MCNC: 0.76 MG/DL (ref 0.6–1.3)
EOSINOPHIL # BLD AUTO: 0.31 THOUSAND/ÂΜL (ref 0–0.61)
EOSINOPHIL NFR BLD AUTO: 5 % (ref 0–6)
ERYTHROCYTE [DISTWIDTH] IN BLOOD BY AUTOMATED COUNT: 13.9 % (ref 11.6–15.1)
GFR SERPL CREATININE-BSD FRML MDRD: 86 ML/MIN/1.73SQ M
GLUCOSE P FAST SERPL-MCNC: 98 MG/DL (ref 65–99)
HCT VFR BLD AUTO: 43 % (ref 34.8–46.1)
HDLC SERPL-MCNC: 57 MG/DL
HGB BLD-MCNC: 13.5 G/DL (ref 11.5–15.4)
IMM GRANULOCYTES # BLD AUTO: 0.02 THOUSAND/UL (ref 0–0.2)
IMM GRANULOCYTES NFR BLD AUTO: 0 % (ref 0–2)
LDLC SERPL CALC-MCNC: 115 MG/DL (ref 0–100)
LYMPHOCYTES # BLD AUTO: 1.79 THOUSANDS/ÂΜL (ref 0.6–4.47)
LYMPHOCYTES NFR BLD AUTO: 29 % (ref 14–44)
MCH RBC QN AUTO: 27 PG (ref 26.8–34.3)
MCHC RBC AUTO-ENTMCNC: 31.4 G/DL (ref 31.4–37.4)
MCV RBC AUTO: 86 FL (ref 82–98)
MONOCYTES # BLD AUTO: 0.57 THOUSAND/ÂΜL (ref 0.17–1.22)
MONOCYTES NFR BLD AUTO: 9 % (ref 4–12)
NEUTROPHILS # BLD AUTO: 3.53 THOUSANDS/ÂΜL (ref 1.85–7.62)
NEUTS SEG NFR BLD AUTO: 56 % (ref 43–75)
NONHDLC SERPL-MCNC: 135 MG/DL
NRBC BLD AUTO-RTO: 0 /100 WBCS
PLATELET # BLD AUTO: 292 THOUSANDS/UL (ref 149–390)
PMV BLD AUTO: 9.3 FL (ref 8.9–12.7)
POTASSIUM SERPL-SCNC: 3.6 MMOL/L (ref 3.5–5.3)
PROT SERPL-MCNC: 7.1 G/DL (ref 6.4–8.4)
RBC # BLD AUTO: 5 MILLION/UL (ref 3.81–5.12)
SODIUM SERPL-SCNC: 141 MMOL/L (ref 135–147)
TRIGL SERPL-MCNC: 101 MG/DL
WBC # BLD AUTO: 6.25 THOUSAND/UL (ref 4.31–10.16)

## 2023-05-23 RX ORDER — PRENATAL VIT/IRON FUM/FOLIC AC 27 MG-1 MG
1 TABLET ORAL DAILY
Qty: 90 TABLET | Refills: 3 | Status: SHIPPED | OUTPATIENT
Start: 2023-05-23

## 2023-05-23 NOTE — PROGRESS NOTES
Name: Nancy Everett      : 1965      MRN: [id number]  Encounter Provider: Quang Ramirez PA-C  Encounter Date: 2023   Encounter department: [facility]     1  Subareolar mass of left breast  Comments:  2 tiny white pustules present x 1 day, suspect skin change due to irritation from bra, recommend warm moist compress, hx breast CA check diag imaging  Orders:  -     US breast left limited (diagnostic); Future; Expected date: 2023  -     Mammo diagnostic left w cad; Future; Expected date: 2023    2  History of cancer  -     Prenatal Vit-Fe Fumarate-FA (Prenatal/Folic Acid) TABS; Take 1 tablet by mouth daily    3  Ductal carcinoma in situ (DCIS) of right breast  Assessment & Plan:  History of R breast CA s/p lumpectomy, normal screening mammo in 2022  No R breast symptoms  In remission  4  Vitamin D deficiency  Assessment & Plan:  Vitamin D 22 2 in 2023, still low despite weekly vitamin D2 high dose, continue same and repeat lab  Consider check PTH  Lab Results   Component Value Date    CALCIUM 9 1 2023         5  Mixed hyperlipidemia  Assessment & Plan:  Lab Results   Component Value Date    CHOLESTEROL 192 2023    CHOLESTEROL 233 (H) 2022    CHOLESTEROL 229 (H) 2021     Lab Results   Component Value Date    HDL 57 2023    HDL 68 2022    HDL 50 2021     Lab Results   Component Value Date    TRIG 101 2023    TRIG 101 2022    TRIG 173 (H) 2021     Lab Results   Component Value Date    NONHDLC 135 2023    [place] 165 2022    [place] 179 2021     Significant improved since last labs, low 2 5% ASCVD risk score, continue low fat diet  Carmen Leonard is a 62 y o  female with a h/o R breast CA and vitamin D deficiency who presents with skin change of L breast x 1 day as a same day walk-in   She checks her breast regularly and daily in "the shower and noticed a \"pimple\" under her nipple, no new lumps  No pain or discharge  She was concerned due to hx of breast CA  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 and walks daily maintaining healthy diet and just got labs done this morning  She did not noticed the other pustule below the breast  No recent change in bra, lotions, soaps, detergents  No itching  Would like a refill on her prenatal vitamins as she is used to taking those  No smoking or ETOH use  History was conducted in [place] without the use of   Review of Systems   Constitutional: Negative for chills, diaphoresis, fever and unexpected weight change  Respiratory: Negative for cough and shortness of breath  Cardiovascular: Negative for chest pain and palpitations  Skin: Positive for rash  Current Outpatient Medications on File Prior to Visit   Medication Sig   • ergocalciferol (VITAMIN D2) 50,000 units Take 1 capsule (50,000 Units total) by mouth once a week       Objective     /82 (BP Location: Left arm, Patient Position: Sitting, Cuff Size: Standard)   Pulse 100   Temp 99 1 °F (37 3 °C) (Tympanic)   Resp 17   Ht 5' 4\" (1 626 m)   Wt 77 1 kg (170 lb)   SpO2 98%   BMI 29 18 kg/m²     Physical Exam  Vitals and nursing note reviewed  Constitutional:       Appearance: Normal appearance  HENT:      Head: Normocephalic and atraumatic  Cardiovascular:      Rate and Rhythm: Normal rate and regular rhythm  Pulses: Normal pulses  Heart sounds: Normal heart sounds  Pulmonary:      Effort: Pulmonary effort is normal       Breath sounds: Normal breath sounds  Chest:   Breasts:     Breasts are asymmetrical       Right: No swelling, bleeding, inverted nipple, mass, nipple discharge, skin change or tenderness  Left: Skin change present  No swelling, bleeding, inverted nipple, mass, nipple discharge or tenderness         Lymphadenopathy:      Upper Body:      Right upper body: No " supraclavicular, axillary or pectoral adenopathy  Left upper body: No supraclavicular, axillary or pectoral adenopathy  Neurological:      Mental Status: She is alert and oriented to person, place, and time  Mental status is at baseline         Johnorne Apgar, PA-C

## 2023-05-24 NOTE — ASSESSMENT & PLAN NOTE
Lab Results   Component Value Date    CHOLESTEROL 192 05/23/2023    CHOLESTEROL 233 (H) 03/08/2022    CHOLESTEROL 229 (H) 02/20/2021     Lab Results   Component Value Date    HDL 57 05/23/2023    HDL 68 03/08/2022    HDL 50 02/20/2021     Lab Results   Component Value Date    TRIG 101 05/23/2023    TRIG 101 03/08/2022    TRIG 173 (H) 02/20/2021     Lab Results   Component Value Date    NONHDLC 135 05/23/2023    [place] 165 03/08/2022    [place] 179 02/20/2021     Significant improved since last labs, low 2 5% ASCVD risk score, continue low fat diet

## 2023-05-24 NOTE — ASSESSMENT & PLAN NOTE
Vitamin D 22 2 in 5/2023, still low despite weekly vitamin D2 high dose, continue same and repeat lab  Consider check PTH      Lab Results   Component Value Date    CALCIUM 9 1 05/23/2023

## 2023-05-24 NOTE — ASSESSMENT & PLAN NOTE
History of R breast CA s/p lumpectomy, normal screening mammo in 9/2022  No R breast symptoms  In remission

## 2023-06-26 ENCOUNTER — HOSPITAL ENCOUNTER (OUTPATIENT)
Dept: BONE DENSITY | Facility: CLINIC | Age: 58
Discharge: HOME | End: 2023-06-26
Payer: COMMERCIAL

## 2023-06-26 DIAGNOSIS — D05.11 DUCTAL CARCINOMA IN SITU (DCIS) OF RIGHT BREAST: ICD-10-CM

## 2023-06-26 DIAGNOSIS — M85.89 OSTEOPENIA OF MULTIPLE SITES: ICD-10-CM

## 2023-06-26 DIAGNOSIS — E55.9 VITAMIN D DEFICIENCY: ICD-10-CM

## 2023-06-26 DIAGNOSIS — Z78.0 POST-MENOPAUSAL: ICD-10-CM

## 2023-06-26 PROCEDURE — 77080 DXA BONE DENSITY AXIAL: CPT

## 2023-07-18 ENCOUNTER — HOSPITAL ENCOUNTER (OUTPATIENT)
Dept: MAMMOGRAPHY | Facility: CLINIC | Age: 58
Discharge: HOME | End: 2023-07-18
Payer: COMMERCIAL

## 2023-07-18 VITALS — WEIGHT: 170 LBS | BODY MASS INDEX: 29.02 KG/M2 | HEIGHT: 64 IN

## 2023-07-18 DIAGNOSIS — N63.42 SUBAREOLAR MASS OF LEFT BREAST: ICD-10-CM

## 2023-07-18 PROCEDURE — 77066 DX MAMMO INCL CAD BI: CPT

## 2023-07-18 PROCEDURE — G0279 TOMOSYNTHESIS, MAMMO: HCPCS

## 2024-02-27 NOTE — TELEPHONE ENCOUNTER
----- Message from Annita Garcia sent at 3/2/2021  2:51 PM EST -----  Regarding: mammogram  03/02/21 2:51 PM    Hello, our patient Deisy Conn has had Mammogram completed/performed  Please assist in updating the patient chart by pulling the document from the Media Tab  The date of service is 06/25/2020       Thank you,  Isabella Calix   [address]
03/03/21 9:17 AM     See documentation in the [employer] SmartForm       Sarah Oscar
Upon review of the In Basket request we were able to locate, review, and update the patient chart as requested for Mammogram     Any additional questions or concerns should be emailed to the Practice Liaisons via [email or web address] email, please do not reply via In Basket      Thank you  Nav Grady
done

## 2024-04-09 ENCOUNTER — TELEPHONE (OUTPATIENT)
Age: 59
End: 2024-04-09

## 2024-04-09 NOTE — TELEPHONE ENCOUNTER
Julianne from [facility] called to follow up on medical records received for pt. She stated they received the forms that were sent, however they were not completed. Please complete and refax to [phone]. Thank you.

## 2024-04-09 NOTE — TELEPHONE ENCOUNTER
Any forms requesting patient information need to be [place] [facility] [place] [phone]

## 2024-04-11 NOTE — TELEPHONE ENCOUNTER
Julianne from [facility] called regarding medical records request. Provided her with [facility] phone number and fax number to send over request and follow up.

## 2024-04-18 ENCOUNTER — OFFICE VISIT (OUTPATIENT)
Dept: FAMILY MEDICINE CLINIC | Facility: CLINIC | Age: 59
End: 2024-04-18
Payer: COMMERCIAL

## 2024-04-18 VITALS
BODY MASS INDEX: 32.57 KG/M2 | OXYGEN SATURATION: 98 % | RESPIRATION RATE: 16 BRPM | HEIGHT: 62 IN | HEART RATE: 92 BPM | TEMPERATURE: 97.8 F | WEIGHT: 177 LBS | DIASTOLIC BLOOD PRESSURE: 80 MMHG | SYSTOLIC BLOOD PRESSURE: 110 MMHG

## 2024-04-18 DIAGNOSIS — Z12.31 BREAST CANCER SCREENING BY MAMMOGRAM: ICD-10-CM

## 2024-04-18 DIAGNOSIS — Z23 NEED FOR SHINGLES VACCINE: ICD-10-CM

## 2024-04-18 DIAGNOSIS — D05.11 DUCTAL CARCINOMA IN SITU (DCIS) OF RIGHT BREAST: ICD-10-CM

## 2024-04-18 DIAGNOSIS — M25.552 PAIN OF LEFT HIP: ICD-10-CM

## 2024-04-18 DIAGNOSIS — Z00.01 ENCOUNTER FOR GENERAL ADULT MEDICAL EXAMINATION WITH ABNORMAL FINDINGS: Primary | ICD-10-CM

## 2024-04-18 DIAGNOSIS — M70.62 TROCHANTERIC BURSITIS OF LEFT HIP: ICD-10-CM

## 2024-04-18 DIAGNOSIS — E55.9 VITAMIN D DEFICIENCY: ICD-10-CM

## 2024-04-18 DIAGNOSIS — Z12.11 COLON CANCER SCREENING: ICD-10-CM

## 2024-04-18 PROCEDURE — 99396 PREV VISIT EST AGE 40-64: CPT | Performed by: FAMILY MEDICINE

## 2024-04-18 PROCEDURE — 99214 OFFICE O/P EST MOD 30 MIN: CPT | Performed by: FAMILY MEDICINE

## 2024-04-18 RX ORDER — ZOSTER VACCINE RECOMBINANT, ADJUVANTED 50 MCG/0.5
0.5 KIT INTRAMUSCULAR ONCE
Qty: 1 EACH | Refills: 1 | Status: SHIPPED | OUTPATIENT
Start: 2024-04-18 | End: 2024-04-18

## 2024-04-18 NOTE — PROGRESS NOTES
"Name: Naye Mcclendon      : 1965      MRN: [id number]  Encounter Provider: Tyler Barahona MD  Encounter Date: 2024   Encounter department: [facility]    Subjective     Chief Complaint  Patient presents for follow-up regarding left hip pain.    History of Present Illness  The patient reports intermittent left hip pain, particularly when descending stairs. The pain is not constant but recurs enough to cause concern. No associated headache, chest pain, or other systemic symptoms were reported.    Medications  Patient did not specify current medications; to be reviewed during consultation.    Allergies  No known drug allergies.    Past Medical History  Patient's past medical history was not detailed in the conversation; to be updated from records.    Past Surgical History  No surgical history was mentioned; to be confirmed with patient.    Social History  Patient is active, engaging in cycling for exercise. No tobacco, alcohol, or illicit drug use was reported.    Family History  Patient has a family history of cancer, as mentioned in the context of a relative's experience.    Review of Systems  General: No weight loss, fever, or chills. Musculoskeletal: Complaints of left hip pain, no other joint pains or muscle weakness reported.    Vital Signs  /80 (BP Location: Left arm, Patient Position: Sitting, Cuff Size: Standard)   Pulse 92   Temp 97.8 °F (36.6 °C) (Tympanic)   Resp 16   Ht 5' 2\" (1.575 m)   Wt 80.3 kg (177 lb)   SpO2 98%   BMI 32.37 kg/m²       Physical Exam  Physical examination to focus on the musculoskeletal assessment of the hip, including range of motion, palpation for tenderness, and assessment for any deformities or asymmetries.    Imaging and other Relevant Results  Recommendation for hip radiography to evaluate the extent of any potential osteoarthritis or other structural abnormalities.    Assessment and Plan     1. Encounter for " general adult medical examination with abnormal findings  -     CBC and differential; Future  -     Comprehensive metabolic panel; Future  -     Lipid Panel with Direct LDL reflex; Future    2. Pain of left hip  -     XR hip/pelv 2-3 vws left if performed; Future; Expected date: 04/18/2024    3. Trochanteric bursitis of left hip  -     Diclofenac Sodium (VOLTAREN) 1 %; Apply 2 g topically 4 (four) times a day    4. Ductal carcinoma in situ (DCIS) of right breast    5. Breast cancer screening by mammogram  -     Mammo screening bilateral w 3d & cad; Future; Expected date: 07/19/2024    6. Vitamin D deficiency  -     Vitamin D 25 hydroxy; Future    7. Colon cancer screening  -     Cologuard    8. Need for shingles vaccine Refuses    1. Trochanteric bursitis of left hip, continue with Diclofenac Sodium application and monitor effectiveness. Encourage continuation of stretching exercises. Reassess need for bursa injection if no improvement.  2. General health maintenance, schedule Comprehensive metabolic panel, CBC, and Lipid Panel to monitor overall health status.  3. Breast cancer screening, schedule bilateral mammogram with 3D imaging as part of routine surveillance for history of DCIS.  4. Vitamin D levels to be checked due to patient's supplementation; adjust dosage if necessary based on results.    Additional Notes:  The patient is advised to maintain a balanced diet with some fat content to aid in the absorption of vitamin D. She is encouraged to continue her active lifestyle, including cycling, and to follow up with any concerns regarding her hip pain or general health. The patient will bring in the home colorectal screening test for review and submission. She is also reminded of the upcoming shingles vaccine, which she has previously declined but declined again.        Tyler Barahona MD   [facility] PRIMARY CARE [facility]

## 2024-05-02 LAB — COLOGUARD RESULT REPORTABLE: NEGATIVE

## 2024-05-03 NOTE — RESULT ENCOUNTER NOTE
Estimada Kanchan Fraser, pappas prueba de detección de cáncer de colon realizada recientemente salio Negativa, esto significa que no hay signos de cáncer de colon. Esta prueba es válida por 3 años.  Dr. Barahona

## 2024-11-14 ENCOUNTER — TELEPHONE (OUTPATIENT)
Dept: FAMILY MEDICINE CLINIC | Facility: CLINIC | Age: 59
End: 2024-11-14

## 2024-11-14 ENCOUNTER — OFFICE VISIT (OUTPATIENT)
Dept: FAMILY MEDICINE CLINIC | Facility: CLINIC | Age: 59
End: 2024-11-14
Payer: COMMERCIAL

## 2024-11-14 VITALS
HEART RATE: 86 BPM | DIASTOLIC BLOOD PRESSURE: 55 MMHG | HEIGHT: 62 IN | BODY MASS INDEX: 32.13 KG/M2 | SYSTOLIC BLOOD PRESSURE: 131 MMHG | TEMPERATURE: 96.7 F | WEIGHT: 174.6 LBS | OXYGEN SATURATION: 99 % | RESPIRATION RATE: 20 BRPM

## 2024-11-14 DIAGNOSIS — Z85.9 HISTORY OF CANCER: ICD-10-CM

## 2024-11-14 DIAGNOSIS — M25.561 ACUTE PAIN OF RIGHT KNEE: Primary | ICD-10-CM

## 2024-11-14 PROBLEM — Z23 NEED FOR SHINGLES VACCINE: Status: RESOLVED | Noted: 2024-04-18 | Resolved: 2024-11-14

## 2024-11-14 PROCEDURE — 99213 OFFICE O/P EST LOW 20 MIN: CPT

## 2024-11-14 RX ORDER — NAPROXEN 500 MG/1
500 TABLET ORAL 2 TIMES DAILY WITH MEALS
Qty: 14 TABLET | Refills: 0 | Status: SHIPPED | OUTPATIENT
Start: 2024-11-14 | End: 2024-11-15

## 2024-11-14 RX ORDER — VITAMIN A, VITAMIN C, VITAMIN D, VITAMIN E, THIAMINE, RIBOFLAVIN, NIACIN, VITAMIN B6, FOLIC ACID, VITAMIN B12, CALCIUM, IRON, ZINC, COPPER 4000; 120; 400; 22; 1.84; 3; 20; 10; 1; 12; 200; 27; 25; 2 [IU]/1; MG/1; [IU]/1; [IU]/1; MG/1; MG/1; MG/1; MG/1; MG/1; UG/1; MG/1; MG/1; MG/1; MG/1
1 TABLET ORAL DAILY
Qty: 90 TABLET | Refills: 3 | Status: SHIPPED | OUTPATIENT
Start: 2024-11-14

## 2024-11-14 NOTE — PROGRESS NOTES
"Name: Naye Mcclendon      : 1965      MRN: [id number]  Encounter Provider: Jose M Baron MD  Encounter Date: 2024   Encounter department: [facility] PRIMARY CARE [facility]  :  Assessment & Plan  Acute pain of right knee  Likely OA. Will send for right knee XR. Will trial naproxen 500mg BID with meals for pain control. Can also use OTC topical NSAIDs for pain control. Should this not improve can consider joint injection. Recommend quad strengthening exercises and weight loss for further assistance with pain.  Orders:    XR knee 3 vw right non injury; Future    naproxen (Naprosyn) 500 mg tablet; Take 1 tablet (500 mg total) by mouth 2 (two) times a day with meals for 7 days    History of cancer  Pt requesting refill of prenatals. Refill sent.  Orders:    Prenatal Vit-Fe Fumarate-FA (M- Plus) 27-1 MG TABS; Take 1 tablet by mouth daily           History of Present Illness     60yo female presenting to [facility] for evaluation of right knee pain. Pt fell down stairs back in September and continues to have medial knee pain. Non-radiating, no weakness or numbness in extremity. She did note mild swelling after injury but no erythema. Pt is always moving around the house as she has a child with special needs. Pt does not like taking medication therefore she has only used topical creams. Pain not interfering with ADLs.      Review of Systems   Constitutional:  Negative for chills and fever.   Respiratory:  Negative for cough and shortness of breath.    Cardiovascular:  Negative for chest pain.   Gastrointestinal:  Negative for abdominal pain, constipation, diarrhea, nausea and vomiting.   Genitourinary:  Negative for dysuria and hematuria.   Musculoskeletal:  Negative for joint swelling.          Objective   /55 (BP Location: Left arm, Patient Position: Sitting, Cuff Size: Standard)   Pulse 86   Temp (!) 96.7 °F (35.9 °C) (Temporal)   Resp 20   Ht 5' 2\" (1.575 m)   Wt " 79.2 kg (174 lb 9.6 oz)   SpO2 99%   BMI 31.93 kg/m²      Physical Exam  Vitals and nursing note reviewed.   Constitutional:       Appearance: Normal appearance.   Eyes:      Conjunctiva/sclera: Conjunctivae normal.   Cardiovascular:      Rate and Rhythm: Normal rate and regular rhythm.      Pulses: Normal pulses.      Heart sounds: Normal heart sounds.   Pulmonary:      Effort: Pulmonary effort is normal.      Breath sounds: Normal breath sounds.   Musculoskeletal:      Right knee: Crepitus present. No swelling, erythema or ecchymosis. Normal range of motion. Tenderness present over the medial joint line. No lateral joint line tenderness. Normal alignment and normal patellar mobility.      Instability Tests: Anterior drawer test negative. Posterior drawer test negative.      Left knee: Crepitus present. No swelling, erythema or ecchymosis. Normal range of motion. No tenderness. No medial joint line or lateral joint line tenderness. Normal alignment and normal patellar mobility.      Instability Tests: Anterior drawer test negative. Posterior drawer test negative.      Right lower leg: Normal.      Left lower leg: Normal.   Skin:     General: Skin is warm and dry.   Neurological:      General: No focal deficit present.      Mental Status: She is alert.   Psychiatric:         Mood and Affect: Mood normal.         Behavior: Behavior normal.

## 2024-11-14 NOTE — PATIENT INSTRUCTIONS
Patient Education     Ejercicios para la extremidad inferior sentado   Acerca de lara renate   Algunas personas no pueden ponerse de pie o tienen problemas de equilibrio. Puede ejercitarse igual en forma quezada sentado en iain silla. Si tiene un trabajo de escritorio, hacer ejercicio mientras está sentado en pappas escritorio también puede ser útil. Existen diferentes ejercicios que puede hacer para lograr que day piernas estén radha y tengan un buen movimiento.  General   Antes de iniciar algún programa, pregunte al médico si pappas carla lo permite. Es posible que el médico le pida que trabaje con un entrenador o fisioterapeuta para elaborar un programa seguro de actividad física que cumpla con day necesidades.  Ejercicios de estiramiento   Los ejercicios de estiramiento mantienen los músculos flexibles. También evitan que se endurezcan. Para comenzar, realice estos estiramientos 2 o 3 veces. Para que pappas cuerpo produzca cambios, deberá sostener estos estiramientos rashaun 20 a 30 segundos. Repita cada ejercicio de 2 a 3 veces cada día. Tina todos los ejercicios de forma lenta.  Elongaciones del tendón de la corva sentado: siéntese recto en el borde de iain silla. Asegúrese de mantener la espalda recta. Estire la rodilla de la pierna izquierda. Mantenga el talón en el suelo. Inclínese hacia adelante a la altura de la cintura hacia el pie, manteniendo la parte superior de la espalda recta. Inclínese hacia adelante hasta que sienta la elongación en la parte posterior del muslo. Repita con la otra pierna.  Estiramientos de pantorrilla sentado con santos o toalla: siéntese en iain silla o en el suelo con las piernas rectas frente a usted. Pase iain santos o toalla alrededor del antepié. Tire de la toalla hasta que sienta el estiramiento en la parte posterior de la pantorrilla. Repita con el otro pie. También puede hacer la misma elongación recostado sobre la espalda con la rodilla recta y el pie en el aire.  Ejercicios de  fortalecimiento   Los ejercicios de fortalecimiento mantienen day músculos firmes y radha. Asegúrese de adoptar iain buena postura. Comience repitiendo cada ejercicio 2 o 3 veces. Progrese hasta hacer cada ejercicio 10 veces. Trate de hacer estos ejercicios 2 o 3 veces al día. Tina todos los ejercicios de forma lenta.  Marcha: marche mientras se encuentre sentado. Siéntese derecho y no se recueste. Levante iain pierna por vez. Realice lara ejercicio en cámara lenta para que sea más difícil.  Ejercicios para muslos: coloque iain pequeña almohada o pelota entre las rodillas. Presione los muslos al mismo tiempo y mantenga la posición rashaun 3 a 5 segundos.  Alfabeto con el tobillo en posición sentada: estire la rodilla sobre la pierna izquierda. Imagine que está escribiendo el abecedario con cada pie. Trace todas las letras del alfabeto. Repita con la otra pierna. Chisana descansos cortos si se cansa. Si es demasiado difícil hacer todo el alfabeto, intente escribir diferentes palabras rashaun los comerciales cuando ve televisión. Trazar letras muy grandes ayudará a fortalecer la cadera, rodilla y tobillo.  Bombeo para tobillos en posición sentada: mueva cada pie hacia arriba y hacia abajo jeannie si estuviera presionando y soltando el pedal del acelerador.               ¿Cuáles serán los resultados?   Músculos más radha  Mejor flexibilidad y amplitud de movimiento  Menos tensión muscular  Menos calambres musculares  Mejor circulación  Mayor facilidad para caminar y realizar otras actividades  Consejos útiles   Manténgase activa y realice ejercicios para mantener los músculos radha y flexibles.  Conserve un peso saludable para evitar un estrés mayor sobre las articulaciones. Siga iain dieta saludable para mantener day músculos sanos.  No contenga la respiración cuando realice actividad física. [place] aumentará la presión arterial. Si usted suele hacer esto, pruebe contar en voz jeremy mientras hace ejercicio. Si algún  ejercicio le general malestar, deje de hacerlo inmediatamente.  Siempre tina un precalentamiento antes de elongar. Los músculos calientes se estiran más fácil que los fríos. Estirar músculos fríos puede causar lesiones.  Intente caminar o andar en bicicleta a un ritmo lento rashaun algunos minutos para calentar los músculos. Tina esto luego de ejercitar.  No rebote cuando elongue.  Si le gusta mirar televisión, intente hacer pernell o dos de estos ejercicios rashaun cada comercial.  Ejercitarse antes de cada comida es iain buena manera de mantener iain rutina.  Es posible que se sienta algo incómodo cuando tina ejercicio, michelle no debería sentir un dolor intenso. Si siente un dolor intenso, deje lo que está haciendo. Si el dolor continúa, llame al médico.  ¿Dónde puedo obtener más información?   NHS Choices  http://www.nhs.uk/Tools/Pages/Exercises-for-older-people.aspx   Exención de responsabilidad y uso de la información del consumidor   Esta información general es un resumen limitado de la información sobre el diagnóstico, el tratamiento y/o la medicación. No pretende ser exhaustivo y debe utilizarse jeannie iain herramienta para ayudar al usuario a comprender y/o evaluar las posibles opciones de diagnóstico y tratamiento. NO incluye toda la información sobre las enfermedades, los tratamientos, los medicamentos, los efectos secundarios o los riesgos que pueden aplicarse a un paciente específico. No tiene por objeto ser un consejo médico ni un sustituto del consejo médico. Tampoco pretende reemplazar al diagnóstico o el tratamiento proporcionados por un proveedor de atención médica con base en el examen y la evaluación por parte de lara proveedor de las circunstancias específicas y únicas de un paciente. Los pacientes deben hablar con un proveedor de atención médica para obtener información completa sobre pappas carla, preguntas médicas y opciones de tratamiento, incluidos los riesgos o beneficios relacionados con el uso de  medicamentos. Esta información no respalda ningún tratamiento o medicamento jeannie seguro, eficaz o aprobado para tratar a un paciente específico. [employer] y day afiliados renuncian a cualquier garantía o responsabilidad relacionada con esta información o con el uso que se ren de esta. El uso de esta información se rige por las Condiciones de uso, disponibles en https://www.[employer].com/en/know/clinical-effectiveness-terms   Copyright   Copyright © 2024 [employer] y day licenciantes y/o afiliados. Todos los derechos reservados.

## 2024-11-15 ENCOUNTER — APPOINTMENT (OUTPATIENT)
Dept: LAB | Facility: HOSPITAL | Age: 59
End: 2024-11-15
Payer: COMMERCIAL

## 2024-11-15 DIAGNOSIS — E55.9 VITAMIN D DEFICIENCY: ICD-10-CM

## 2024-11-15 DIAGNOSIS — Z00.01 ENCOUNTER FOR GENERAL ADULT MEDICAL EXAMINATION WITH ABNORMAL FINDINGS: ICD-10-CM

## 2024-11-15 DIAGNOSIS — M25.561 ACUTE PAIN OF RIGHT KNEE: ICD-10-CM

## 2024-11-15 LAB
25(OH)D3 SERPL-MCNC: 27.3 NG/ML (ref 30–100)
ALBUMIN SERPL BCG-MCNC: 4 G/DL (ref 3.5–5)
ALP SERPL-CCNC: 81 U/L (ref 34–104)
ALT SERPL W P-5'-P-CCNC: 16 U/L (ref 7–52)
ANION GAP SERPL CALCULATED.3IONS-SCNC: 7 MMOL/L (ref 4–13)
AST SERPL W P-5'-P-CCNC: 14 U/L (ref 13–39)
BASOPHILS # BLD AUTO: 0.04 THOUSANDS/ÂΜL (ref 0–0.1)
BASOPHILS NFR BLD AUTO: 1 % (ref 0–1)
BILIRUB SERPL-MCNC: 0.47 MG/DL (ref 0.2–1)
BUN SERPL-MCNC: 17 MG/DL (ref 5–25)
CALCIUM SERPL-MCNC: 9.1 MG/DL (ref 8.4–10.2)
CHLORIDE SERPL-SCNC: 105 MMOL/L (ref 96–108)
CHOLEST SERPL-MCNC: 183 MG/DL (ref ?–200)
CO2 SERPL-SCNC: 29 MMOL/L (ref 21–32)
CREAT SERPL-MCNC: 0.79 MG/DL (ref 0.6–1.3)
EOSINOPHIL # BLD AUTO: 0.34 THOUSAND/ÂΜL (ref 0–0.61)
EOSINOPHIL NFR BLD AUTO: 5 % (ref 0–6)
ERYTHROCYTE [DISTWIDTH] IN BLOOD BY AUTOMATED COUNT: 13.6 % (ref 11.6–15.1)
GFR SERPL CREATININE-BSD FRML MDRD: 82 ML/MIN/1.73SQ M
GLUCOSE P FAST SERPL-MCNC: 113 MG/DL (ref 65–99)
HCT VFR BLD AUTO: 45.4 % (ref 34.8–46.1)
HDLC SERPL-MCNC: 53 MG/DL
HGB BLD-MCNC: 14 G/DL (ref 11.5–15.4)
IMM GRANULOCYTES # BLD AUTO: 0.03 THOUSAND/UL (ref 0–0.2)
IMM GRANULOCYTES NFR BLD AUTO: 0 % (ref 0–2)
LDLC SERPL CALC-MCNC: 105 MG/DL (ref 0–100)
LYMPHOCYTES # BLD AUTO: 2.1 THOUSANDS/ÂΜL (ref 0.6–4.47)
LYMPHOCYTES NFR BLD AUTO: 29 % (ref 14–44)
MCH RBC QN AUTO: 27.3 PG (ref 26.8–34.3)
MCHC RBC AUTO-ENTMCNC: 30.8 G/DL (ref 31.4–37.4)
MCV RBC AUTO: 89 FL (ref 82–98)
MONOCYTES # BLD AUTO: 0.64 THOUSAND/ÂΜL (ref 0.17–1.22)
MONOCYTES NFR BLD AUTO: 9 % (ref 4–12)
NEUTROPHILS # BLD AUTO: 4 THOUSANDS/ÂΜL (ref 1.85–7.62)
NEUTS SEG NFR BLD AUTO: 56 % (ref 43–75)
NRBC BLD AUTO-RTO: 0 /100 WBCS
PLATELET # BLD AUTO: 282 THOUSANDS/UL (ref 149–390)
PMV BLD AUTO: 9.2 FL (ref 8.9–12.7)
POTASSIUM SERPL-SCNC: 4.3 MMOL/L (ref 3.5–5.3)
PROT SERPL-MCNC: 7.2 G/DL (ref 6.4–8.4)
RBC # BLD AUTO: 5.13 MILLION/UL (ref 3.81–5.12)
SODIUM SERPL-SCNC: 141 MMOL/L (ref 135–147)
TRIGL SERPL-MCNC: 125 MG/DL (ref ?–150)
WBC # BLD AUTO: 7.15 THOUSAND/UL (ref 4.31–10.16)

## 2024-11-15 PROCEDURE — 36415 COLL VENOUS BLD VENIPUNCTURE: CPT

## 2024-11-15 PROCEDURE — 80053 COMPREHEN METABOLIC PANEL: CPT

## 2024-11-15 PROCEDURE — 82306 VITAMIN D 25 HYDROXY: CPT

## 2024-11-15 PROCEDURE — 80061 LIPID PANEL: CPT

## 2024-11-15 PROCEDURE — 85025 COMPLETE CBC W/AUTO DIFF WBC: CPT

## 2024-11-15 RX ORDER — NAPROXEN 500 MG/1
TABLET ORAL
Qty: 14 TABLET | Refills: 0 | Status: SHIPPED | OUTPATIENT
Start: 2024-11-15

## 2024-11-17 ENCOUNTER — RESULTS FOLLOW-UP (OUTPATIENT)
Dept: FAMILY MEDICINE CLINIC | Facility: CLINIC | Age: 59
End: 2024-11-17

## 2024-11-17 NOTE — RESULT ENCOUNTER NOTE
Naye: The labs showed low vitamin D as the previous 3 years.   Please purchase vitamin D over-the-counter 2000 units and take 1 capsule with dinner every day. The rest of the labs are in normal limits.